# Patient Record
Sex: FEMALE | Race: WHITE | NOT HISPANIC OR LATINO | Employment: FULL TIME | URBAN - METROPOLITAN AREA
[De-identification: names, ages, dates, MRNs, and addresses within clinical notes are randomized per-mention and may not be internally consistent; named-entity substitution may affect disease eponyms.]

---

## 2017-02-07 ENCOUNTER — GENERIC CONVERSION - ENCOUNTER (OUTPATIENT)
Dept: OTHER | Facility: OTHER | Age: 32
End: 2017-02-07

## 2017-03-08 ENCOUNTER — ALLSCRIPTS OFFICE VISIT (OUTPATIENT)
Dept: OTHER | Facility: OTHER | Age: 32
End: 2017-03-08

## 2017-03-08 DIAGNOSIS — E53.8 DEFICIENCY OF OTHER SPECIFIED B GROUP VITAMINS: ICD-10-CM

## 2017-03-08 DIAGNOSIS — E55.9 VITAMIN D DEFICIENCY: ICD-10-CM

## 2017-03-08 DIAGNOSIS — E66.01 MORBID (SEVERE) OBESITY DUE TO EXCESS CALORIES (HCC): ICD-10-CM

## 2017-03-08 DIAGNOSIS — R53.83 OTHER FATIGUE: ICD-10-CM

## 2017-03-23 ENCOUNTER — TRANSCRIBE ORDERS (OUTPATIENT)
Dept: LAB | Facility: CLINIC | Age: 32
End: 2017-03-23

## 2017-03-23 ENCOUNTER — APPOINTMENT (OUTPATIENT)
Dept: LAB | Facility: CLINIC | Age: 32
End: 2017-03-23
Payer: COMMERCIAL

## 2017-03-23 DIAGNOSIS — E55.9 VITAMIN D DEFICIENCY: ICD-10-CM

## 2017-03-23 DIAGNOSIS — E53.8 DEFICIENCY OF OTHER SPECIFIED B GROUP VITAMINS: ICD-10-CM

## 2017-03-23 DIAGNOSIS — R53.83 OTHER FATIGUE: ICD-10-CM

## 2017-03-23 DIAGNOSIS — E66.01 MORBID (SEVERE) OBESITY DUE TO EXCESS CALORIES (HCC): ICD-10-CM

## 2017-03-23 LAB
25(OH)D3 SERPL-MCNC: 11.1 NG/ML (ref 30–100)
ALBUMIN SERPL BCP-MCNC: 4.1 G/DL (ref 3.5–5)
ALP SERPL-CCNC: 101 U/L (ref 46–116)
ALT SERPL W P-5'-P-CCNC: 35 U/L (ref 12–78)
ANION GAP SERPL CALCULATED.3IONS-SCNC: 8 MMOL/L (ref 4–13)
AST SERPL W P-5'-P-CCNC: 17 U/L (ref 5–45)
BILIRUB SERPL-MCNC: 0.42 MG/DL (ref 0.2–1)
BUN SERPL-MCNC: 10 MG/DL (ref 5–25)
CALCIUM SERPL-MCNC: 9.4 MG/DL (ref 8.3–10.1)
CHLORIDE SERPL-SCNC: 104 MMOL/L (ref 100–108)
CHOLEST SERPL-MCNC: 170 MG/DL (ref 50–200)
CO2 SERPL-SCNC: 27 MMOL/L (ref 21–32)
CREAT SERPL-MCNC: 0.78 MG/DL (ref 0.6–1.3)
ERYTHROCYTE [DISTWIDTH] IN BLOOD BY AUTOMATED COUNT: 12.6 % (ref 11.6–15.1)
GFR SERPL CREATININE-BSD FRML MDRD: >60 ML/MIN/1.73SQ M
GLUCOSE P FAST SERPL-MCNC: 84 MG/DL (ref 65–99)
HCT VFR BLD AUTO: 41.5 % (ref 34.8–46.1)
HDLC SERPL-MCNC: 56 MG/DL (ref 40–60)
HGB BLD-MCNC: 14.5 G/DL (ref 11.5–15.4)
LDLC SERPL CALC-MCNC: 84 MG/DL (ref 0–100)
MCH RBC QN AUTO: 31.5 PG (ref 26.8–34.3)
MCHC RBC AUTO-ENTMCNC: 34.9 G/DL (ref 31.4–37.4)
MCV RBC AUTO: 90 FL (ref 82–98)
PLATELET # BLD AUTO: 253 THOUSANDS/UL (ref 149–390)
PMV BLD AUTO: 10.4 FL (ref 8.9–12.7)
POTASSIUM SERPL-SCNC: 3.8 MMOL/L (ref 3.5–5.3)
PROT SERPL-MCNC: 8 G/DL (ref 6.4–8.2)
RBC # BLD AUTO: 4.6 MILLION/UL (ref 3.81–5.12)
SODIUM SERPL-SCNC: 139 MMOL/L (ref 136–145)
TRIGL SERPL-MCNC: 149 MG/DL
TSH SERPL DL<=0.05 MIU/L-ACNC: 3 UIU/ML (ref 0.36–3.74)
VIT B12 SERPL-MCNC: 471 PG/ML (ref 100–900)
WBC # BLD AUTO: 7.4 THOUSAND/UL (ref 4.31–10.16)

## 2017-03-23 PROCEDURE — 82306 VITAMIN D 25 HYDROXY: CPT

## 2017-03-23 PROCEDURE — 84443 ASSAY THYROID STIM HORMONE: CPT

## 2017-03-23 PROCEDURE — 85027 COMPLETE CBC AUTOMATED: CPT

## 2017-03-23 PROCEDURE — 80053 COMPREHEN METABOLIC PANEL: CPT

## 2017-03-23 PROCEDURE — 82607 VITAMIN B-12: CPT

## 2017-03-23 PROCEDURE — 80061 LIPID PANEL: CPT

## 2017-03-23 PROCEDURE — 36415 COLL VENOUS BLD VENIPUNCTURE: CPT

## 2017-03-24 ENCOUNTER — GENERIC CONVERSION - ENCOUNTER (OUTPATIENT)
Dept: OTHER | Facility: OTHER | Age: 32
End: 2017-03-24

## 2017-04-04 ENCOUNTER — ALLSCRIPTS OFFICE VISIT (OUTPATIENT)
Dept: OTHER | Facility: OTHER | Age: 32
End: 2017-04-04

## 2017-04-13 ENCOUNTER — ALLSCRIPTS OFFICE VISIT (OUTPATIENT)
Dept: OTHER | Facility: OTHER | Age: 32
End: 2017-04-13

## 2017-04-13 DIAGNOSIS — Z98.84 BARIATRIC SURGERY STATUS: ICD-10-CM

## 2017-04-18 ENCOUNTER — GENERIC CONVERSION - ENCOUNTER (OUTPATIENT)
Dept: OTHER | Facility: OTHER | Age: 32
End: 2017-04-18

## 2017-04-20 ENCOUNTER — HOSPITAL ENCOUNTER (OUTPATIENT)
Dept: RADIOLOGY | Facility: HOSPITAL | Age: 32
Discharge: HOME | End: 2017-04-20
Attending: SURGERY
Payer: COMMERCIAL

## 2017-04-20 DIAGNOSIS — Z98.84 BARIATRIC SURGERY STATUS: ICD-10-CM

## 2017-04-20 PROCEDURE — 74240 X-RAY XM UPR GI TRC 1CNTRST: CPT

## 2017-04-25 ENCOUNTER — GENERIC CONVERSION - ENCOUNTER (OUTPATIENT)
Dept: OTHER | Facility: OTHER | Age: 32
End: 2017-04-25

## 2017-05-02 ENCOUNTER — ANESTHESIA EVENT (OUTPATIENT)
Dept: GASTROENTEROLOGY | Facility: HOSPITAL | Age: 32
End: 2017-05-02
Payer: COMMERCIAL

## 2017-05-02 RX ORDER — ERGOCALCIFEROL 1.25 MG/1
50000 CAPSULE ORAL 2 TIMES WEEKLY
COMMUNITY
End: 2018-04-04 | Stop reason: ALTCHOICE

## 2017-05-03 ENCOUNTER — ANESTHESIA (OUTPATIENT)
Dept: GASTROENTEROLOGY | Facility: HOSPITAL | Age: 32
End: 2017-05-03
Payer: COMMERCIAL

## 2017-05-03 ENCOUNTER — HOSPITAL ENCOUNTER (OUTPATIENT)
Facility: HOSPITAL | Age: 32
Setting detail: OUTPATIENT SURGERY
Discharge: HOME | End: 2017-05-03
Attending: SURGERY | Admitting: SURGERY
Payer: COMMERCIAL

## 2017-05-03 VITALS
HEART RATE: 63 BPM | OXYGEN SATURATION: 100 % | BODY MASS INDEX: 46.01 KG/M2 | RESPIRATION RATE: 20 BRPM | SYSTOLIC BLOOD PRESSURE: 139 MMHG | HEIGHT: 62 IN | WEIGHT: 250 LBS | DIASTOLIC BLOOD PRESSURE: 58 MMHG | TEMPERATURE: 96.5 F

## 2017-05-03 DIAGNOSIS — E66.9 OBESITY: ICD-10-CM

## 2017-05-03 PROCEDURE — 88305 TISSUE EXAM BY PATHOLOGIST: CPT | Performed by: SURGERY

## 2017-05-03 PROCEDURE — 88342 IMHCHEM/IMCYTCHM 1ST ANTB: CPT | Performed by: SURGERY

## 2017-05-03 RX ORDER — PROPOFOL 10 MG/ML
INJECTION, EMULSION INTRAVENOUS AS NEEDED
Status: DISCONTINUED | OUTPATIENT
Start: 2017-05-03 | End: 2017-05-03 | Stop reason: SURG

## 2017-05-03 RX ORDER — SODIUM CHLORIDE 9 MG/ML
125 INJECTION, SOLUTION INTRAVENOUS CONTINUOUS
Status: DISCONTINUED | OUTPATIENT
Start: 2017-05-03 | End: 2017-05-03 | Stop reason: HOSPADM

## 2017-05-03 RX ADMIN — PROPOFOL 50 MG: 10 INJECTION, EMULSION INTRAVENOUS at 09:47

## 2017-05-03 RX ADMIN — LIDOCAINE HYDROCHLORIDE 20 MG: 20 INJECTION, SOLUTION INTRAVENOUS at 09:45

## 2017-05-03 RX ADMIN — PROPOFOL 200 MG: 10 INJECTION, EMULSION INTRAVENOUS at 09:45

## 2017-05-03 RX ADMIN — SODIUM CHLORIDE 125 ML/HR: 0.9 INJECTION, SOLUTION INTRAVENOUS at 09:38

## 2017-05-03 RX ADMIN — PROPOFOL 50 MG: 10 INJECTION, EMULSION INTRAVENOUS at 09:48

## 2017-05-08 ENCOUNTER — ALLSCRIPTS OFFICE VISIT (OUTPATIENT)
Dept: OTHER | Facility: OTHER | Age: 32
End: 2017-05-08

## 2017-05-16 ENCOUNTER — TRANSCRIBE ORDERS (OUTPATIENT)
Dept: LAB | Facility: CLINIC | Age: 32
End: 2017-05-16

## 2017-05-16 ENCOUNTER — LAB (OUTPATIENT)
Dept: LAB | Facility: CLINIC | Age: 32
End: 2017-05-16
Payer: COMMERCIAL

## 2017-05-16 DIAGNOSIS — E66.01 MORBID OBESITY, UNSPECIFIED OBESITY TYPE (HCC): Primary | ICD-10-CM

## 2017-05-16 DIAGNOSIS — E66.01 MORBID OBESITY, UNSPECIFIED OBESITY TYPE (HCC): ICD-10-CM

## 2017-05-16 PROCEDURE — 80323 ALKALOIDS NOS: CPT

## 2017-05-16 PROCEDURE — 36415 COLL VENOUS BLD VENIPUNCTURE: CPT

## 2017-05-22 LAB — MISCELLANEOUS LAB TEST RESULT: NORMAL

## 2017-06-05 ENCOUNTER — ALLSCRIPTS OFFICE VISIT (OUTPATIENT)
Dept: OTHER | Facility: OTHER | Age: 32
End: 2017-06-05

## 2017-06-06 ENCOUNTER — GENERIC CONVERSION - ENCOUNTER (OUTPATIENT)
Dept: OTHER | Facility: OTHER | Age: 32
End: 2017-06-06

## 2017-07-05 ENCOUNTER — ANESTHESIA EVENT (OUTPATIENT)
Dept: PERIOP | Facility: HOSPITAL | Age: 32
DRG: 621 | End: 2017-07-05
Payer: COMMERCIAL

## 2017-07-06 ENCOUNTER — ALLSCRIPTS OFFICE VISIT (OUTPATIENT)
Dept: OTHER | Facility: OTHER | Age: 32
End: 2017-07-06

## 2017-07-11 ENCOUNTER — GENERIC CONVERSION - ENCOUNTER (OUTPATIENT)
Dept: OTHER | Facility: OTHER | Age: 32
End: 2017-07-11

## 2017-07-17 ENCOUNTER — GENERIC CONVERSION - ENCOUNTER (OUTPATIENT)
Dept: OTHER | Facility: OTHER | Age: 32
End: 2017-07-17

## 2017-07-18 ENCOUNTER — GENERIC CONVERSION - ENCOUNTER (OUTPATIENT)
Dept: OTHER | Facility: OTHER | Age: 32
End: 2017-07-18

## 2017-07-20 ENCOUNTER — GENERIC CONVERSION - ENCOUNTER (OUTPATIENT)
Dept: OTHER | Facility: OTHER | Age: 32
End: 2017-07-20

## 2017-07-25 ENCOUNTER — HOSPITAL ENCOUNTER (INPATIENT)
Facility: HOSPITAL | Age: 32
LOS: 1 days | Discharge: HOME | DRG: 621 | End: 2017-07-26
Attending: SURGERY | Admitting: SURGERY
Payer: COMMERCIAL

## 2017-07-25 ENCOUNTER — ANESTHESIA (OUTPATIENT)
Dept: PERIOP | Facility: HOSPITAL | Age: 32
DRG: 621 | End: 2017-07-25
Payer: COMMERCIAL

## 2017-07-25 DIAGNOSIS — E66.01 MORBID OBESITY, UNSPECIFIED OBESITY TYPE (HCC): Primary | ICD-10-CM

## 2017-07-25 PROCEDURE — 0DNT4ZZ RELEASE LESSER OMENTUM, PERCUTANEOUS ENDOSCOPIC APPROACH: ICD-10-PCS | Performed by: SURGERY

## 2017-07-25 PROCEDURE — 0D164ZA BYPASS STOMACH TO JEJUNUM, PERCUTANEOUS ENDOSCOPIC APPROACH: ICD-10-PCS | Performed by: SURGERY

## 2017-07-25 PROCEDURE — 0DNL4ZZ RELEASE TRANSVERSE COLON, PERCUTANEOUS ENDOSCOPIC APPROACH: ICD-10-PCS | Performed by: SURGERY

## 2017-07-25 RX ORDER — SODIUM CHLORIDE, SODIUM LACTATE, POTASSIUM CHLORIDE, CALCIUM CHLORIDE 600; 310; 30; 20 MG/100ML; MG/100ML; MG/100ML; MG/100ML
125 INJECTION, SOLUTION INTRAVENOUS CONTINUOUS
Status: DISCONTINUED | OUTPATIENT
Start: 2017-07-25 | End: 2017-07-26

## 2017-07-25 RX ORDER — MORPHINE SULFATE 1 MG/ML
INJECTION, SOLUTION EPIDURAL; INTRATHECAL; INTRAVENOUS AS NEEDED
Status: DISCONTINUED | OUTPATIENT
Start: 2017-07-25 | End: 2017-07-25 | Stop reason: SURG

## 2017-07-25 RX ORDER — OXYCODONE HCL 5 MG/5 ML
5 SOLUTION, ORAL ORAL EVERY 4 HOURS PRN
Status: DISCONTINUED | OUTPATIENT
Start: 2017-07-25 | End: 2017-07-26 | Stop reason: HOSPADM

## 2017-07-25 RX ORDER — PROMETHAZINE HYDROCHLORIDE 25 MG/ML
25 INJECTION, SOLUTION INTRAMUSCULAR; INTRAVENOUS EVERY 4 HOURS PRN
Status: DISCONTINUED | OUTPATIENT
Start: 2017-07-25 | End: 2017-07-26 | Stop reason: HOSPADM

## 2017-07-25 RX ORDER — FENTANYL CITRATE/PF 50 MCG/ML
50 SYRINGE (ML) INJECTION
Status: DISCONTINUED | OUTPATIENT
Start: 2017-07-25 | End: 2017-07-25 | Stop reason: HOSPADM

## 2017-07-25 RX ORDER — HYDRALAZINE HYDROCHLORIDE 20 MG/ML
10 INJECTION INTRAMUSCULAR; INTRAVENOUS EVERY 6 HOURS PRN
Status: DISCONTINUED | OUTPATIENT
Start: 2017-07-25 | End: 2017-07-26 | Stop reason: HOSPADM

## 2017-07-25 RX ORDER — MORPHINE SULFATE 2 MG/ML
2 INJECTION, SOLUTION INTRAMUSCULAR; INTRAVENOUS EVERY 2 HOUR PRN
Status: DISCONTINUED | OUTPATIENT
Start: 2017-07-25 | End: 2017-07-26 | Stop reason: HOSPADM

## 2017-07-25 RX ORDER — ACETAMINOPHEN 160 MG/5ML
325 SUSPENSION, ORAL (FINAL DOSE FORM) ORAL EVERY 4 HOURS PRN
Status: DISCONTINUED | OUTPATIENT
Start: 2017-07-25 | End: 2017-07-26 | Stop reason: HOSPADM

## 2017-07-25 RX ORDER — GLYCOPYRROLATE 0.2 MG/ML
INJECTION INTRAMUSCULAR; INTRAVENOUS AS NEEDED
Status: DISCONTINUED | OUTPATIENT
Start: 2017-07-25 | End: 2017-07-25 | Stop reason: SURG

## 2017-07-25 RX ORDER — MIDAZOLAM HYDROCHLORIDE 1 MG/ML
INJECTION INTRAMUSCULAR; INTRAVENOUS AS NEEDED
Status: DISCONTINUED | OUTPATIENT
Start: 2017-07-25 | End: 2017-07-25 | Stop reason: SURG

## 2017-07-25 RX ORDER — FENTANYL CITRATE 50 UG/ML
INJECTION, SOLUTION INTRAMUSCULAR; INTRAVENOUS AS NEEDED
Status: DISCONTINUED | OUTPATIENT
Start: 2017-07-25 | End: 2017-07-25 | Stop reason: SURG

## 2017-07-25 RX ORDER — SODIUM CHLORIDE 9 MG/ML
125 INJECTION, SOLUTION INTRAVENOUS CONTINUOUS
Status: DISCONTINUED | OUTPATIENT
Start: 2017-07-25 | End: 2017-07-25

## 2017-07-25 RX ORDER — HEPARIN SODIUM 5000 [USP'U]/ML
5000 INJECTION, SOLUTION INTRAVENOUS; SUBCUTANEOUS ONCE
Status: COMPLETED | OUTPATIENT
Start: 2017-07-25 | End: 2017-07-25

## 2017-07-25 RX ORDER — ROCURONIUM BROMIDE 10 MG/ML
INJECTION, SOLUTION INTRAVENOUS AS NEEDED
Status: DISCONTINUED | OUTPATIENT
Start: 2017-07-25 | End: 2017-07-25 | Stop reason: SURG

## 2017-07-25 RX ORDER — PANTOPRAZOLE SODIUM 40 MG/1
40 INJECTION, POWDER, FOR SOLUTION INTRAVENOUS
Status: DISCONTINUED | OUTPATIENT
Start: 2017-07-26 | End: 2017-07-26 | Stop reason: HOSPADM

## 2017-07-25 RX ORDER — BUPIVACAINE HYDROCHLORIDE AND EPINEPHRINE 5; 5 MG/ML; UG/ML
INJECTION, SOLUTION PERINEURAL AS NEEDED
Status: DISCONTINUED | OUTPATIENT
Start: 2017-07-25 | End: 2017-07-25 | Stop reason: HOSPADM

## 2017-07-25 RX ORDER — OXYCODONE HCL 5 MG/5 ML
10 SOLUTION, ORAL ORAL EVERY 4 HOURS PRN
Status: DISCONTINUED | OUTPATIENT
Start: 2017-07-25 | End: 2017-07-26 | Stop reason: HOSPADM

## 2017-07-25 RX ORDER — ALBUTEROL SULFATE 2.5 MG/3ML
2.5 SOLUTION RESPIRATORY (INHALATION) ONCE AS NEEDED
Status: DISCONTINUED | OUTPATIENT
Start: 2017-07-25 | End: 2017-07-25 | Stop reason: HOSPADM

## 2017-07-25 RX ORDER — MORPHINE SULFATE 4 MG/ML
4 INJECTION, SOLUTION INTRAMUSCULAR; INTRAVENOUS EVERY 2 HOUR PRN
Status: DISCONTINUED | OUTPATIENT
Start: 2017-07-25 | End: 2017-07-26 | Stop reason: HOSPADM

## 2017-07-25 RX ORDER — MAGNESIUM HYDROXIDE 1200 MG/15ML
LIQUID ORAL AS NEEDED
Status: DISCONTINUED | OUTPATIENT
Start: 2017-07-25 | End: 2017-07-25 | Stop reason: HOSPADM

## 2017-07-25 RX ORDER — ONDANSETRON 2 MG/ML
INJECTION INTRAMUSCULAR; INTRAVENOUS AS NEEDED
Status: DISCONTINUED | OUTPATIENT
Start: 2017-07-25 | End: 2017-07-25 | Stop reason: SURG

## 2017-07-25 RX ORDER — MEPERIDINE HYDROCHLORIDE 50 MG/ML
12.5 INJECTION INTRAMUSCULAR; INTRAVENOUS; SUBCUTANEOUS AS NEEDED
Status: DISCONTINUED | OUTPATIENT
Start: 2017-07-25 | End: 2017-07-25 | Stop reason: HOSPADM

## 2017-07-25 RX ORDER — ACETAMINOPHEN 160 MG/5ML
320 SUSPENSION, ORAL (FINAL DOSE FORM) ORAL EVERY 4 HOURS PRN
Status: DISCONTINUED | OUTPATIENT
Start: 2017-07-25 | End: 2017-07-26 | Stop reason: HOSPADM

## 2017-07-25 RX ORDER — PROPOFOL 10 MG/ML
INJECTION, EMULSION INTRAVENOUS AS NEEDED
Status: DISCONTINUED | OUTPATIENT
Start: 2017-07-25 | End: 2017-07-25 | Stop reason: SURG

## 2017-07-25 RX ORDER — DEXAMETHASONE SODIUM PHOSPHATE 4 MG/ML
INJECTION, SOLUTION INTRA-ARTICULAR; INTRALESIONAL; INTRAMUSCULAR; INTRAVENOUS; SOFT TISSUE AS NEEDED
Status: DISCONTINUED | OUTPATIENT
Start: 2017-07-25 | End: 2017-07-25 | Stop reason: SURG

## 2017-07-25 RX ORDER — ONDANSETRON 2 MG/ML
4 INJECTION INTRAMUSCULAR; INTRAVENOUS EVERY 4 HOURS PRN
Status: DISCONTINUED | OUTPATIENT
Start: 2017-07-25 | End: 2017-07-26 | Stop reason: HOSPADM

## 2017-07-25 RX ADMIN — METRONIDAZOLE 500 MG: 500 INJECTION, SOLUTION INTRAVENOUS at 08:44

## 2017-07-25 RX ADMIN — ONDANSETRON HYDROCHLORIDE 4 MG: 2 INJECTION, SOLUTION INTRAVENOUS at 10:39

## 2017-07-25 RX ADMIN — ROCURONIUM BROMIDE 10 MG: 10 INJECTION, SOLUTION INTRAVENOUS at 09:45

## 2017-07-25 RX ADMIN — FENTANYL CITRATE 50 MCG: 50 INJECTION, SOLUTION INTRAMUSCULAR; INTRAVENOUS at 10:33

## 2017-07-25 RX ADMIN — MIDAZOLAM HYDROCHLORIDE 2 MG: 1 INJECTION, SOLUTION INTRAMUSCULAR; INTRAVENOUS at 08:57

## 2017-07-25 RX ADMIN — SODIUM CHLORIDE, SODIUM LACTATE, POTASSIUM CHLORIDE, AND CALCIUM CHLORIDE 125 ML/HR: .6; .31; .03; .02 INJECTION, SOLUTION INTRAVENOUS at 11:21

## 2017-07-25 RX ADMIN — HEPARIN SODIUM 5000 UNITS: 5000 INJECTION, SOLUTION INTRAVENOUS; SUBCUTANEOUS at 08:04

## 2017-07-25 RX ADMIN — LIDOCAINE HYDROCHLORIDE 3 ML: 20 INJECTION, SOLUTION INTRAVENOUS at 08:36

## 2017-07-25 RX ADMIN — FENTANYL CITRATE: 50 INJECTION INTRAMUSCULAR; INTRAVENOUS at 11:18

## 2017-07-25 RX ADMIN — FENTANYL CITRATE 100 MCG: 50 INJECTION, SOLUTION INTRAMUSCULAR; INTRAVENOUS at 08:36

## 2017-07-25 RX ADMIN — GLYCOPYRROLATE 0.6 MG: 0.2 INJECTION, SOLUTION INTRAMUSCULAR; INTRAVENOUS at 10:45

## 2017-07-25 RX ADMIN — FENTANYL CITRATE: 50 INJECTION INTRAMUSCULAR; INTRAVENOUS at 11:08

## 2017-07-25 RX ADMIN — PROPOFOL 300 MG: 10 INJECTION, EMULSION INTRAVENOUS at 08:36

## 2017-07-25 RX ADMIN — NEOSTIGMINE METHYLSULFATE 3 MG: 1 INJECTION, SOLUTION INTRAMUSCULAR; INTRAVENOUS; SUBCUTANEOUS at 10:45

## 2017-07-25 RX ADMIN — DEXAMETHASONE SODIUM PHOSPHATE 8 MG: 4 INJECTION, SOLUTION INTRAMUSCULAR; INTRAVENOUS at 08:50

## 2017-07-25 RX ADMIN — MORPHINE SULFATE 5 MG: 1 INJECTION, SOLUTION EPIDURAL; INTRATHECAL; INTRAVENOUS at 10:52

## 2017-07-25 RX ADMIN — CEFAZOLIN SODIUM 2000 MG: 2 SOLUTION INTRAVENOUS at 08:42

## 2017-07-25 RX ADMIN — SODIUM CHLORIDE 125 ML/HR: 0.9 INJECTION, SOLUTION INTRAVENOUS at 06:59

## 2017-07-25 RX ADMIN — CEFAZOLIN SODIUM 2000 MG: 2 SOLUTION INTRAVENOUS at 17:38

## 2017-07-25 RX ADMIN — FENTANYL CITRATE 50 MCG: 50 INJECTION, SOLUTION INTRAMUSCULAR; INTRAVENOUS at 10:39

## 2017-07-25 RX ADMIN — GLYCOPYRROLATE 0.2 MG: 0.2 INJECTION, SOLUTION INTRAMUSCULAR; INTRAVENOUS at 09:13

## 2017-07-25 RX ADMIN — ROCURONIUM BROMIDE 50 MG: 10 INJECTION, SOLUTION INTRAVENOUS at 08:36

## 2017-07-25 RX ADMIN — METRONIDAZOLE 500 MG: 500 INJECTION, SOLUTION INTRAVENOUS at 19:07

## 2017-07-25 RX ADMIN — ROCURONIUM BROMIDE 10 MG: 10 INJECTION, SOLUTION INTRAVENOUS at 09:34

## 2017-07-25 RX ADMIN — MORPHINE SULFATE 5 MG: 1 INJECTION, SOLUTION EPIDURAL; INTRATHECAL; INTRAVENOUS at 10:39

## 2017-07-25 RX ADMIN — MORPHINE SULFATE 4 MG: 4 INJECTION, SOLUTION INTRAMUSCULAR; INTRAVENOUS at 13:17

## 2017-07-25 RX ADMIN — ROCURONIUM BROMIDE 5 MG: 10 INJECTION, SOLUTION INTRAVENOUS at 10:17

## 2017-07-26 VITALS
RESPIRATION RATE: 16 BRPM | OXYGEN SATURATION: 96 % | DIASTOLIC BLOOD PRESSURE: 58 MMHG | WEIGHT: 245.19 LBS | HEIGHT: 62 IN | BODY MASS INDEX: 45.12 KG/M2 | HEART RATE: 86 BPM | SYSTOLIC BLOOD PRESSURE: 118 MMHG | TEMPERATURE: 97.5 F

## 2017-07-26 LAB
ANION GAP SERPL CALCULATED.3IONS-SCNC: 8 MMOL/L (ref 4–13)
BUN SERPL-MCNC: 6 MG/DL (ref 5–25)
CALCIUM SERPL-MCNC: 8.9 MG/DL (ref 8.3–10.1)
CHLORIDE SERPL-SCNC: 103 MMOL/L (ref 100–108)
CO2 SERPL-SCNC: 26 MMOL/L (ref 21–32)
CREAT SERPL-MCNC: 0.62 MG/DL (ref 0.6–1.3)
ERYTHROCYTE [DISTWIDTH] IN BLOOD BY AUTOMATED COUNT: 12.8 % (ref 11.6–15.1)
GFR SERPL CREATININE-BSD FRML MDRD: 121 ML/MIN/1.73SQ M
GLUCOSE SERPL-MCNC: 106 MG/DL (ref 65–140)
HCT VFR BLD AUTO: 35 % (ref 34.8–46.1)
HGB BLD-MCNC: 11.9 G/DL (ref 11.5–15.4)
MCH RBC QN AUTO: 31 PG (ref 26.8–34.3)
MCHC RBC AUTO-ENTMCNC: 34 G/DL (ref 31.4–37.4)
MCV RBC AUTO: 91 FL (ref 82–98)
PLATELET # BLD AUTO: 197 THOUSANDS/UL (ref 149–390)
PMV BLD AUTO: 11.5 FL (ref 8.9–12.7)
POTASSIUM SERPL-SCNC: 3.9 MMOL/L (ref 3.5–5.3)
RBC # BLD AUTO: 3.84 MILLION/UL (ref 3.81–5.12)
SODIUM SERPL-SCNC: 137 MMOL/L (ref 136–145)
WBC # BLD AUTO: 15.41 THOUSAND/UL (ref 4.31–10.16)

## 2017-07-26 PROCEDURE — 85027 COMPLETE CBC AUTOMATED: CPT | Performed by: SURGERY

## 2017-07-26 PROCEDURE — C9113 INJ PANTOPRAZOLE SODIUM, VIA: HCPCS | Performed by: SURGERY

## 2017-07-26 PROCEDURE — 80048 BASIC METABOLIC PNL TOTAL CA: CPT | Performed by: SURGERY

## 2017-07-26 RX ORDER — OXYCODONE HYDROCHLORIDE AND ACETAMINOPHEN 5; 325 MG/1; MG/1
1 TABLET ORAL EVERY 4 HOURS PRN
Qty: 25 TABLET | Refills: 0 | Status: SHIPPED | OUTPATIENT
Start: 2017-07-26 | End: 2017-08-09

## 2017-07-26 RX ADMIN — CEFAZOLIN SODIUM 2000 MG: 2 SOLUTION INTRAVENOUS at 02:14

## 2017-07-26 RX ADMIN — PANTOPRAZOLE SODIUM 40 MG: 40 INJECTION, POWDER, FOR SOLUTION INTRAVENOUS at 08:26

## 2017-07-26 RX ADMIN — SODIUM CHLORIDE, SODIUM LACTATE, POTASSIUM CHLORIDE, AND CALCIUM CHLORIDE 125 ML/HR: .6; .31; .03; .02 INJECTION, SOLUTION INTRAVENOUS at 06:00

## 2017-07-26 RX ADMIN — METRONIDAZOLE 500 MG: 500 INJECTION, SOLUTION INTRAVENOUS at 02:46

## 2017-07-28 ENCOUNTER — GENERIC CONVERSION - ENCOUNTER (OUTPATIENT)
Dept: OTHER | Facility: OTHER | Age: 32
End: 2017-07-28

## 2017-08-02 ENCOUNTER — ALLSCRIPTS OFFICE VISIT (OUTPATIENT)
Dept: OTHER | Facility: OTHER | Age: 32
End: 2017-08-02

## 2017-08-04 ENCOUNTER — ALLSCRIPTS OFFICE VISIT (OUTPATIENT)
Dept: OTHER | Facility: OTHER | Age: 32
End: 2017-08-04

## 2017-09-12 ENCOUNTER — GENERIC CONVERSION - ENCOUNTER (OUTPATIENT)
Dept: OTHER | Facility: OTHER | Age: 32
End: 2017-09-12

## 2017-11-06 ENCOUNTER — APPOINTMENT (OUTPATIENT)
Dept: LAB | Facility: HOSPITAL | Age: 32
End: 2017-11-06
Payer: COMMERCIAL

## 2017-11-06 ENCOUNTER — OFFICE VISIT (OUTPATIENT)
Dept: URGENT CARE | Facility: MEDICAL CENTER | Age: 32
End: 2017-11-06
Payer: COMMERCIAL

## 2017-11-06 DIAGNOSIS — R12 HEARTBURN: ICD-10-CM

## 2017-11-06 DIAGNOSIS — R05.9 COUGH: ICD-10-CM

## 2017-11-06 DIAGNOSIS — Z98.84 BARIATRIC SURGERY STATUS: ICD-10-CM

## 2017-11-06 DIAGNOSIS — K91.2 POSTSURGICAL MALABSORPTION, NOT ELSEWHERE CLASSIFIED (CODE): ICD-10-CM

## 2017-11-06 DIAGNOSIS — E55.9 VITAMIN D DEFICIENCY: ICD-10-CM

## 2017-11-06 PROCEDURE — 87070 CULTURE OTHR SPECIMN AEROBIC: CPT

## 2017-11-06 PROCEDURE — 87147 CULTURE TYPE IMMUNOLOGIC: CPT

## 2017-11-06 PROCEDURE — 99203 OFFICE O/P NEW LOW 30 MIN: CPT

## 2017-11-07 LAB — BACTERIA THROAT CULT: ABNORMAL

## 2017-11-07 NOTE — PROGRESS NOTES
Assessment  1  Cough (786 2) (R05)   2  Acute frontal sinusitis (461 1) (J01 10)    Plan  Acute frontal sinusitis    · Amoxicillin 875 MG Oral Tablet; TAKE 1 TABLET EVERY 12 HOURS DAILY   · Fluticasone Propionate 50 MCG/ACT Nasal Suspension; USE 2 SPRAYS IN EACH  NOSTRIL ONCE DAILY  Cough    · (1) THROAT CULTURE (CULTURE, UPPER RESPIRATORY); Status:Active -  Retrospective By Protocol Authorization; Requested [id number];     Discussion/Summary  Discussion Summary:   Patient started on amoxicillin 875 mg b i d  for 10 days and fluticasone nasal spray for nasal congestion postnasal drip  Encourage patient to continue Chloraseptic spray as needed for sore throat  Throat culture performed  Medication Side Effects Reviewed: Possible side effects of new medications were reviewed with the patient/guardian today  Understands and agrees with treatment plan: The treatment plan was reviewed with the patient/guardian  The patient/guardian understands and agrees with the treatment plan   Counseling Documentation With Imm: The patient was counseled regarding diagnostic results  Chief Complaint  1  Cold Symptoms  Chief Complaint Free Text Note Form: Patient here with cold symptoms for two weeks  She has been using Chloraseptic spray for her throat  She reports swollen glands especially left side, as well as a productive cough for yellow sputum  Denies fever  History of Present Illness  HPI: The patient is a 33year old female who presents to [place] [address] Now with a chief complaint of cold symptoms for 2 weeks  The patient stated that her symptoms got worse yesterday, and she noticed her swollen cervical lymph nodes  The patient has associated productive cough that is worse at night, sore throat with swallowing, headaches, and chills  The patient denies any otalgia, fever, chest pain, shortness of breath, nausea, abdominal pain, episodes of emesis or diarrhea   The patient has been taking chloroseptic spray and Robitussin for her symptoms, but has been getting minor relief  The patient has seasonal allergies, but does not currently take any decongestant medications  The patientâs daughter was recently diagnosed with bronchitis  Hospital Based Practices Required Assessment:   Pain Assessment   the patient states they have pain  The pain is located in the headache  (on a scale of 0 to 10, the patient rates the pain at 4 )   Abuse And Domestic Violence Screen   Domestic violence screen not done today  Reason DV Screen not done: not alone    Depression And Suicide Screen  Suicide screen not done today  -- Reason suicide screen not done: not alone  Prefered Language is  [place]  Primary Language is  English  Review of Systems  Focused-Female:   Constitutional: chills, but-- no fever  ENT: sore throat-- and-- nasal discharge, but-- no earache  Cardiovascular: no complaints of slow or fast heart rate, no chest pain, no palpitations, no leg claudication or lower extremity edema  Respiratory: cough, but-- no shortness of breath  Gastrointestinal: no abdominal pain,-- no nausea,-- no vomiting-- and-- no diarrhea  Neurological: headache  ROS Reviewed:   ROS reviewed  Active Problems  1  Blood tests prior to treatment or procedure (V72 63) (Z01 812)   2  Fatigue (780 79) (R53 83)   3  Heartburn (787 1) (R12)   4  Heat intolerance (780 99) (R68 89)   5  History of laparoscopic adjustable gastric banding (V45 86) (Z98 84)   6  Hospital discharge follow-up (V67 59) (Z09)   7  Hot flashes (627 2) (N95 1)   8  Irritable bowel syndrome (564 1) (K58 9)   9  Migraine, ophthalmoplegic (346 20) (G43 B0)   10  Morbid obesity (278 01) (E66 01)   11  Postgastrectomy malabsorption (579 3) (K91 2,Z90 3)   12  Preop cardiovascular exam (V72 81) (Z01 810)   13  Stopped smoking between 1 and 3 months ago (V15 82) (Z87 891)   14  Vitamin B12 deficiency (266 2) (E53 8)   15   Vitamin D deficiency (268 9) (E55 9)    Past Medical History  1  History of A Fall (E888 9)   2  History of Acute frontal sinusitis (461 1) (J01 10)   3  History of Cyst of joint of ankle or foot (719 87)   4  History of Elevated blood pressure   5  History of acute bronchitis (V12 69) (Z87 09)   6  History of dysfunctional uterine bleeding (V13 29) (Z87 42)   7  History of fatigue (V13 89) (Z87 898)   8  History of tachycardia (V13 89) (Z87 898)   9  History of Palpitations (785 1) (R00 2)   10  History of Skin Lesion  Active Problems And Past Medical History Reviewed: The active problems and past medical history were reviewed and updated today  Family History  Mother    1  Family history of Atrial Fibrillation   2  Family history of Family Health Status Of Mother - Alive   3  Family history of Gallbladder Disease  Father    4  Denied: Family history of Family Health Status Father  Brother    5  Family history of Family Health Status Brother 1   6  Family history of hypertension (V17 49) (Z82 49)  Family History    7  Family history of diabetes mellitus (V18 0) (Z83 3)   8  Family history of hypertension (V17 49) (Z82 49)    Social History   · Caffeine Use   · Current every day smoker (305 1) (F17 200)   · Living Independently With Spouse   · Marital History - Currently    · No illicit drug use   · Non drinker / no alcohol use   · Occupation:   · Passive smoke exposure (V15 89) (Z77 22)   · Sexually Active   · Stopped smoking between 1 and 3 months ago (V15 82) (Z87 891)   · Two children   · Uses Safety Equipment - Seatbelts    Surgical History  1  History of  Section   2  History of  Section   3  History of Cholecystectomy   4  History of Gallbladder Surgery   5  History of Gastric Surgery For Morbid Obesity Bypass With Fe-en-Y   6  History of Hysterectomy   7  History of Hysterectomy   8  History of Laparosc Gastric Restrictive Proc By Adjustable Gastric Band   9   History of Laparosc Gastric Restrictive Proc By Adjustable Gastric Band   10  History of Laparosc Sm Intest Laser Vaporization Endometriotic Tissue    Current Meds   1  Daily Multiple Vitamins TABS; Therapy: (Recorded:41Uex7595) to Recorded   2  Omeprazole 20 MG Oral Capsule Delayed Release; TAKE 1 CAPSULE DAILY EVERY   MORNING BEFORE BREAKFAST; Therapy: 09MXB2831 to (Harjinder Bliss)  Requested for: 41SQZ3663; Last   Rx:29Xbk4247 Ordered   3  Vitamin D (Ergocalciferol) 32478 UNIT Oral Capsule; Take 1 capsule twice weekly for 12   weeks; Therapy: 31KWT5025 to (Last Rx:24Mar2017)  Requested for: 24Mar2017 Ordered  Medication List Reviewed: The medication list was reviewed and updated today  Allergies  1  No Known Drug Allergies    Vitals  Signs   Recorded: 69VAZ4529 11:31AM   Temperature: 97 6 F, Tympanic  Heart Rate: 90  Respiration: 18  Systolic: 453  Diastolic: 74  Height: 5 ft 2 in  Weight: 219 lb 6 4 oz  BMI Calculated: 40 13  BSA Calculated: 1 99  O2 Saturation: 96  Pain Scale: 4    Physical Exam    Constitutional   General appearance: No acute distress, well appearing and well nourished  -- Appears stated age  Ears, Nose, Mouth, and Throat   External inspection of ears and nose: Normal     Otoscopic examination: Tympanic membranes translucent with normal light reflex  Canals patent without erythema  Nasal mucosa, septum, and turbinates: Abnormal  -- Erythematous and edematous nasal mucus membranes bilaterally  Oropharynx: Abnormal  -- Cobble-stoning appearance of the posterior oropharynx  Possible white tonsillar stones present bilaterally  Pulmonary   Respiratory effort: No increased work of breathing or signs of respiratory distress  Auscultation of lungs: Clear to auscultation  Cardiovascular   Auscultation of heart: Normal rate and rhythm, normal S1 and S2, without murmurs  Lymphatic   Palpation of lymph nodes in neck: Abnormal   Swollen and tender anterior cervical lymphadenopathy bilaterally     Psychiatric Orientation to person, place, and time: Normal     Additional Exam:  Frontal and maxillary sinus tenderness with palpation        Results/Data  Rapid James Pascual- POC 31XGN8105 12:14PM Christy Mejias     Test Name Result Flag Reference   Rapid Strep Negative                   Lab Studies Reviewed: Rapid strep test-negative      Future Appointments    Date/Time Provider Specialty Site   11/08/2017 10:00 AM José Luis Vance, [facility] General Surgery [facility]     Signatures   Electronically signed by : CRUZITO Bobo ; Nov 6 2017 12:40PM EST                       (Author)

## 2017-11-08 ENCOUNTER — ALLSCRIPTS OFFICE VISIT (OUTPATIENT)
Dept: OTHER | Facility: OTHER | Age: 32
End: 2017-11-08

## 2017-11-09 NOTE — PROGRESS NOTES
Assessment  1  S/P gastric bypass (V45 86) (Z98 84)   2  Postgastrectomy malabsorption (579 3) (K91 2,Z90 3)   3  Heartburn (787 1) (R12)   4  Vitamin D deficiency (268 9) (E55 9)   5  Obesity (BMI 30-39 9) (278 00) (E66 9)    Plan  Heartburn, Postgastrectomy malabsorption, S/P gastric bypass, Vitamin D deficiency    · (1) CBC/ PLT (NO DIFF); Status:Active; Requested QVN:35BPY4506;    Perform:[facility]; AFTAB:47DMS4914; Ordered;For:Heartburn, Postgastrectomy malabsorption, S/P gastric bypass, Vitamin D deficiency; Ordered By:Vandana Vance;   · (1) COMPREHENSIVE METABOLIC PANEL; Status:Active; Requested OGP:01RMG0883;    Perform:[facility]; BAR:20PCJ8830; Ordered;For:Heartburn, Postgastrectomy malabsorption, S/P gastric bypass, Vitamin D deficiency; Ordered By:Vandana Vance;   · (1) FERRITIN; Status:Active; Requested LQR:57SGN2114;    Perform:[facility]; LXM:02GAI0684; Ordered;For:Heartburn, Postgastrectomy malabsorption, S/P gastric bypass, Vitamin D deficiency; Ordered By:Vandana Vance;   · (1) FOLATE; Status:Active; Requested VJP:74WPX8731;    Perform:[facility]; NNE:07UNB0987; Ordered;For:Heartburn, Postgastrectomy malabsorption, S/P gastric bypass, Vitamin D deficiency; Ordered By:Vandana Vance;   · (1) LIPID PANEL, FASTING; Status:Active; Requested LDJ:[id number];    Perform:[facility]; DWE:68QVD8970; Ordered;For:Heartburn, Postgastrectomy malabsorption, S/P gastric bypass, Vitamin D deficiency; Ordered By:Tracee Vance;   · (1) PTH N-TERMINAL (INTACT); Status:Active; Requested [id number];    Perform:[facility]; FELI:00OPI2636; Ordered;For:Heartburn, Postgastrectomy malabsorption, S/P gastric bypass, Vitamin D deficiency; Ordered By:Vandana Vance;   · (1) VITAMIN A; Status:Active; Requested LOP:73NPX3017;    Perform:[facility]; OYZ:44LSK3518; Ordered;For:Heartburn, Postgastrectomy malabsorption, S/P gastric bypass, Vitamin D deficiency; Ordered By:Tracee Vance;   · (1) VITAMIN B1, WHOLE BLOOD; Status:Active; Requested KVF:[id number];    Perform:[facility]; PBB:09ZQG3152; Ordered;For:Heartburn, Postgastrectomy malabsorption, S/P gastric bypass, Vitamin D deficiency; Ordered By:Pierce Vance;   · (1) VITAMIN B12; Status:Active; Requested XYC:68KNI3948;    Perform:[facility]; AEY:92DFO0332; Ordered;For:Heartburn, Postgastrectomy malabsorption, S/P gastric bypass, Vitamin D deficiency; Ordered By:Pierce Vance;   · (1) VITAMIN D 25-HYDROXY; Status:Active; Requested NAL:28OYP2001;    Perform:[facility]; XKR:73RWT8830; Ordered;Postgastrectomy malabsorption, S/P gastric bypass, Vitamin D deficiency; Ordered By:Pierce Vance;    Discussion/Summary    Follow up in 3 months  Follow diet as discussed  Get lab work done prior to your next office visit  Follow vitamin/mineral recommendations as reviewed with you  Exercise as tolerated  our office if you have any problems with abdominal pain especially if associated with fever, chills, nausea, vomiting, or any other concerns  lap Fe-en-y gastric bypass surgery/obesity bmi 89year old female   status post laparoscopic Fe-en-Y gastric bypass surgery by Dr Arabella Rangel here for routine follow-up   Has no complaints todaytolerating a regular dieteating at least 60 grams of protein per day30/60 minute rule with liquidsat least 64 ounces of fluid per dayexercising regularly-she is doing a combination of cardio and weight resistance for up to 1 hour at least 4 days a week  has lost 51% excess body weight loss and considering higher starting BMI her weight loss is excellent for this timeframeBMI is now 35 7  Malabsorption- pateint is at risk for malabsorption of vitamins/minerals secondary to malabsorption from her procedure and restriction of intakescurrent supplements and advised on sameis currently taking 4 bariatric fusion and one calcium supplement with Dto add an extra 2000 IU vitamin d3 daily  is due for routine labs-she will get these done before her next visit in 3 months  vitamin D deficiency-she took high potency vitamin D supplement for 8 weeks as advised pre-op-advised on maintenance dose now- advised to add an EXTRA 2000 IU vitamin D3 daily to current bariatric vitamins  await repeat labs for further advice-recommended she get labs done next month after increasing supplement  heart burn-controlled on ppi-continue for now  The patient has the current Goals: Continued weight loss with good nutrition intakesvitamin/mineral levelsas tolerated  The patent has the current Barriers: None identified  Patient is able to Self-Care  Educational resources provided: N/a  Possible side effects of new medications were reviewed with the patient/guardian today  The treatment plan was reviewed with the patient/guardian  The patient/guardian understands and agrees with the treatment plan   She was advised to follow up due to malabsorption risks  Self Referrals: No      Chief Complaint  Patient said she tolerates diet well, exercises regularly and takes vitamins daily  Post-Op  Post-Op Bariatric Surgery:  Ella Sierra is status post laparoscopic Fe-en-Y procedure,-- performed on 7/25/2017--   by Dr Hiram Byrd  HPI: today's weight is 195 lb pounds,-- today's BMI is 35 7-- and-- her total weight loss is 51% excess body weight loss pounds  The patient reports no nausea,-- no vomiting,-- no constipation,-- no diarrhea,-- no chest pain-- and-- no abdominal pain  Diet and Exercise: Diet history reviewed and discussed with the patient  Weight loss/gains to date discussed with the patient  Supplements: multivitamins-- and-- calcium  PE:  Abdominal exam: soft-- and-- no incisional hernia  Assessment:  Post-op, the patient is doing well  Plan: Activity restrictions: None    Instructions / Recommendations: recommended a daily protein intake of  grams,-- vitamin supplement(s) recommended,-- mineral supplement(s) recommended,-- recommended exercising at least 150 minutes per week,-- diet as discussed-- and-- instructed to call the office for concerns, questions, or problems  The patient was instructed to follow up in 3 months  Review of Systems   Constitutional: planned weight loss, but-- not feeling poorly  Cardiovascular: no chest pain-- and-- no palpitations  Respiratory: no shortness of breath-- and-- no wheezing  Gastrointestinal: no abdominal pain,-- no nausea,-- no vomiting,-- no constipation-- and-- no diarrhea  Psychiatric: no anxiety-- and-- no depression  Active Problems    1  Acute frontal sinusitis (461 1) (J01 10)   2  Blood tests prior to treatment or procedure (V72 63) (Z01 812)   3  Cough (786 2) (R05)   4  Fatigue (780 79) (R53 83)   5  Heartburn (787 1) (R12)   6  Heat intolerance (780 99) (R68 89)   7  History of laparoscopic adjustable gastric banding (V45 86) (Z98 84)   8  Hospital discharge follow-up (V67 59) (Z09)   9  Hot flashes (627 2) (N95 1)   10  Irritable bowel syndrome (564 1) (K58 9)   11  Migraine, ophthalmoplegic (346 20) (G43 B0)   12  Morbid obesity (278 01) (E66 01)   13  Postgastrectomy malabsorption (579 3) (K91 2,Z90 3)   14  Preop cardiovascular exam (V72 81) (Z01 810)   15  Stopped smoking between 1 and 3 months ago (V15 82) (Z87 891)   16  Vitamin B12 deficiency (266 2) (E53 8)   17   Vitamin D deficiency (268 9) (E55 9)    Social History     · Caffeine Use   · Current every day smoker (305 1) (F17 200)   · Living Independently With Spouse   · AND CHILDREN   · Marital History - Currently    · No illicit drug use   · Non drinker / no alcohol use   · Occupation:   ·    · Passive smoke exposure (V15 89) (Z77 22)   · Sexually Active   · Stopped smoking between 1 and 3 months ago (V15 82) (Z87 891)   · Two children   · Uses Safety Equipment - Seatbelts  The social history was reviewed and updated today  Current Meds   1  Amoxicillin 875 MG Oral Tablet; TAKE 1 TABLET EVERY 12 HOURS DAILY; Therapy: 91XVT9057 to (Evaluate:16Nov2017)  Requested for: 17FLV4782; Last Rx:06Nov2017 Ordered   2  Calcium CHEW; Therapy: (Recorded:08Nov2017) to Recorded   3  Daily Multiple Vitamins TABS; Therapy: (Recorded:08May2017) to Recorded   4  Fluticasone Propionate 50 MCG/ACT Nasal Suspension; USE 2 SPRAYS IN EACH NOSTRIL ONCE DAILY; Therapy: 77SIN8181 to (Last GM:92LIF9299)  Requested for: 88GOS3997 Ordered   5  Omeprazole 20 MG Oral Capsule Delayed Release; TAKE 1 CAPSULE DAILY EVERY MORNING BEFORE BREAKFAST; Therapy: 53GSN3394 to ([place]u [place])  Requested for: 55IYP8319; Last Rx:80Kjh8913 Ordered    The medication list was reviewed and updated today  Allergies  1  No Known Drug Allergies    Vitals   Recorded: 11BRX0941 09:45AM   Temperature 98 1 F   Heart Rate 64   Respiration 14   Systolic 017   Diastolic 70   Height 5 ft 2 in   Weight 195 lb    BMI Calculated 35 67   BSA Calculated 1 89       Physical Exam   Constitutional  General appearance: No acute distress, well appearing and well nourished  Eyes bilateral conjunctiva without pallor  Ears, Nose, Mouth, and Throat mucous membranes moist   Pulmonary  Respiratory effort: No increased work of breathing or signs of respiratory distress  Auscultation of lungs: Clear to auscultation  Cardiovascular  Auscultation of heart: Normal rate and rhythm, normal S1 and S2, without murmurs  Abdomen soft, no incisional hernias appreciated    Musculoskeletal  Gait and station: Normal    Psychiatric  Orientation to person, place, and time: Normal    Mood and affect: Normal          Future Appointments    Date/Time Provider Specialty Site   02/09/2018 10:00 AM Karlene Vance [address] General Surgery [facility]       Signatures   Electronically signed by : Sydnie Orellana; Nov 8 2017 10:16AM EST (Author)    Electronically signed by : CRUZITO Tubbs ; Nov 8 2017 11:44AM EST                       (Co-author)

## 2018-01-09 NOTE — PROGRESS NOTES
Message  Pt called to cancel today's appointment- pt no longer needs 6 month program for her insurance  Reviewed workflow with pt  Pt had cardiac appointment yesterday and states she has completed nicotine test as well  Will notify pt's  that pt is ready for scheduling  Active Problems    1  Blood tests prior to treatment or procedure (V72 63) (Z01 812)   2  Fatigue (780 79) (R53 83)   3  Heartburn (787 1) (R12)   4  Heat intolerance (780 99) (R68 89)   5  History of laparoscopic adjustable gastric banding (V45 86) (Z98 84)   6  Hot flashes (627 2) (N95 1)   7  Irritable bowel syndrome (564 1) (K58 9)   8  Migraine, ophthalmoplegic (346 20) (G43 B0)   9  Morbid obesity (278 01) (E66 01)   10  Preop cardiovascular exam (V72 81) (Z01 810)   11  Stopped smoking between 1 and 3 months ago (V15 82) (Z87 891)   12  Tachycardia (785 0) (R00 0)   13  Vitamin B12 deficiency (266 2) (E53 8)   14  Vitamin D deficiency (268 9) (E55 9)    Current Meds   1  Daily Multiple Vitamins TABS; Therapy: (Recorded:73Smp4773) to Recorded   2  Vitamin D (Ergocalciferol) 95046 UNIT Oral Capsule; Take 1 capsule twice weekly for 12   weeks; Therapy: 84DSG3692 to (Last Rx:24Mar2017)  Requested for: 24Mar2017 Ordered    Allergies    1   No Known Drug Allergies    Signatures   Electronically signed by : SAVI Nye RD; Jun 6 2017 10:10AM EST                       (Author)

## 2018-01-12 VITALS
WEIGHT: 260.03 LBS | HEART RATE: 79 BPM | HEIGHT: 62 IN | SYSTOLIC BLOOD PRESSURE: 130 MMHG | TEMPERATURE: 97.5 F | BODY MASS INDEX: 47.85 KG/M2 | RESPIRATION RATE: 13 BRPM | DIASTOLIC BLOOD PRESSURE: 86 MMHG

## 2018-01-12 VITALS — BODY MASS INDEX: 45.45 KG/M2 | HEIGHT: 62 IN | WEIGHT: 247 LBS

## 2018-01-12 NOTE — PROGRESS NOTES
Message  placed pre op call  patient very concerned about losing the 20 pounds dr Juan Diego Willard told her she needs to lose before surgery  she states she is following the liquid diet and eating 2 cups of non-starchy vegetables daily  patient willing to come in Tuesday 7/18/17 at 7:30am for weight check  Active Problems    1  Blood tests prior to treatment or procedure (V72 63) (Z01 812)   2  Fatigue (780 79) (R53 83)   3  Heartburn (787 1) (R12)   4  Heat intolerance (780 99) (R68 89)   5  History of laparoscopic adjustable gastric banding (V45 86) (Z98 84)   6  Hot flashes (627 2) (N95 1)   7  Irritable bowel syndrome (564 1) (K58 9)   8  Migraine, ophthalmoplegic (346 20) (G43 B0)   9  Morbid obesity (278 01) (E66 01)   10  Preop cardiovascular exam (V72 81) (Z01 810)   11  Stopped smoking between 1 and 3 months ago (V15 82) (Z87 891)   12  Tachycardia (785 0) (R00 0)   13  Vitamin B12 deficiency (266 2) (E53 8)   14  Vitamin D deficiency (268 9) (E55 9)    Current Meds   1  Daily Multiple Vitamins TABS; Therapy: (Recorded:98Lzn8723) to Recorded   2  Omeprazole 20 MG Oral Capsule Delayed Release; TAKE 1 CAPSULE DAILY EVERY   MORNING BEFORE BREAKFAST; Therapy: 57PUR2928 to (Valencia Braun)  Requested for: 50YXG9297; Last   Rx:58Yns7626 Ordered   3  Oxycodone-Acetaminophen 5-325 MG Oral Tablet (Percocet); TAKE 1 TABLET EVERY 4   TO 6 HOURS AS NEEDED FOR PAIN;   Therapy: 06NLA9931 to (Evaluate:48Exz5862); Last Rx:55Cjp2846 Ordered   4  Vitamin D (Ergocalciferol) 61751 UNIT Oral Capsule; Take 1 capsule twice weekly for 12   weeks; Therapy: 42HNH6372 to (Last Rx:24Mar2017)  Requested for: 24Mar2017 Ordered    Allergies    1  No Known Drug Allergies    Signatures   Electronically signed by :  Linda Vela, RAMILAWMSWMSKARLA,TARA; Jul 17 2017 11:53AM EST                       (Author)

## 2018-01-12 NOTE — PROGRESS NOTES
Chief Complaint  Chief Complaint Free Text Note Form: due to patient's high level of anxiety prior to surgery she was concerned about her pre-surgery weight loss  Invited patient to come in for a weight check this morning at 7:15  Patient was encouraged to continue to follow the medical and dietary directions she was provided  Discussed stress management skills and encouraged patient to practice 10reps of deep breathing 4x's daily  Patient will arrive for her Thursday weight check early in the morning  Active Problems    1  Blood tests prior to treatment or procedure (V72 63) (Z01 812)   2  Fatigue (780 79) (R53 83)   3  Heartburn (787 1) (R12)   4  Heat intolerance (780 99) (R68 89)   5  History of laparoscopic adjustable gastric banding (V45 86) (Z98 84)   6  Hot flashes (627 2) (N95 1)   7  Irritable bowel syndrome (564 1) (K58 9)   8  Migraine, ophthalmoplegic (346 20) (G43 B0)   9  Morbid obesity (278 01) (E66 01)   10  Preop cardiovascular exam (V72 81) (Z01 810)   11  Stopped smoking between 1 and 3 months ago (V15 82) (Z87 891)   12  Tachycardia (785 0) (R00 0)   13  Vitamin B12 deficiency (266 2) (E53 8)   14  Vitamin D deficiency (268 9) (E55 9)    Past Medical History    1  History of A Fall (E888 9)   2  History of Acute frontal sinusitis (461 1) (J01 10)   3  History of Cyst of joint of ankle or foot (719 87)   4  History of Elevated blood pressure   5  History of acute bronchitis (V12 69) (Z87 09)   6  History of dysfunctional uterine bleeding (V13 29) (Z87 42)   7  History of fatigue (V13 89) (Z87 898)   8  History of Palpitations (785 1) (R00 2)   9  History of Skin Lesion    Surgical History    1  History of  Section   2  History of  Section   3  History of Cholecystectomy   4  History of Gallbladder Surgery   5  History of Hysterectomy   6  History of Hysterectomy   7  History of Laparosc Gastric Restrictive Proc By Adjustable Gastric Band   8   History of Laparosc Gastric Restrictive Proc By Adjustable Gastric Band   9  History of Laparosc Sm Intest Laser Vaporization Endometriotic Tissue    Family History  Mother    1  Family history of Atrial Fibrillation   2  Family history of Family Health Status Of Mother - Alive   3  Family history of Gallbladder Disease  Father    4  Denied: Family history of Family Health Status Father  Brother    5  Family history of Family Health Status Brother 1   6  Family history of hypertension (V17 49) (Z82 49)  Family History    7  Family history of diabetes mellitus (V18 0) (Z83 3)   8  Family history of hypertension (V17 49) (Z82 49)    Social History    · Caffeine Use   · Current every day smoker (305 1) (F17 200)   · Living Independently With Spouse   · Marital History - Currently    · No illicit drug use   · Non drinker / no alcohol use   · Occupation:   · Passive smoke exposure (V15 89) (Z77 22)   · Sexually Active   · Stopped smoking between 1 and 3 months ago (V15 82) (Z87 891)   · Two children   · Uses Safety Equipment - Seatbelts    Current Meds   1  Daily Multiple Vitamins TABS; Therapy: (Recorded:97Lde1584) to Recorded   2  Omeprazole 20 MG Oral Capsule Delayed Release; TAKE 1 CAPSULE DAILY EVERY   MORNING BEFORE BREAKFAST; Therapy: 09UHD3207 to (Sara Coburn)  Requested for: 52VQS1947; Last   Rx:21Nxo7093 Ordered   3  Oxycodone-Acetaminophen 5-325 MG Oral Tablet (Percocet); TAKE 1 TABLET EVERY 4   TO 6 HOURS AS NEEDED FOR PAIN;   Therapy: 21WDT9702 to (Evaluate:76Ezm9744); Last Rx:78Gcm8028 Ordered   4  Vitamin D (Ergocalciferol) 34485 UNIT Oral Capsule; Take 1 capsule twice weekly for 12   weeks; Therapy: 65GZQ9905 to (Last Rx:24Mar2017)  Requested for: 24Mar2017 Ordered    Allergies    1  No Known Drug Allergies    Future Appointments    Date/Time Provider Specialty Site   08/04/2017 09:30 AM CRUZITO Owens  General Surgery [place] [address] [facility]     Signatures   Electronically signed by :  Michaelle Correa Demetrius Boas; Jul 18 2017 10:03AM EST                       (Author)    Electronically signed by :  CRUZITO Martínez ; Jul 24 2017 12:14PM EST

## 2018-01-12 NOTE — PROGRESS NOTES
History of Present Illness  Bariatric MNT [facility] Screening Preop [facility]:     She was on time  the appointment lasted: 60 minutes  Her surgeon is Dr Hiram Byrd  The patient was present at the session  The diagnosis/reason for the appointment is: She has Grade III Obesity with a BMI of 46 8  Diet Order: Nutritional Assesment for Bariatric Surgery  Her goal weight is 172#-184#  She has the following comorbidities: elke ,  x2, Lap Band , Band removal   Labs: Gordo Anthony She was reminded to have her labs drawn   She does not need to monitor her glucose  Exercise Frequency:  She exercises walks 3-4 days per week approx 30-60 minutes  Relationship to food: She eats when she is bored and eats large portions  She knows the difference between being comfortably full and uncomfortably full and knows the difference between being stuffed and comfortably full  She felt/thought they felt her best at 130-150# pounds she last weighed this   She completed a food journal She drinks 3-4 cups of water daily She drinks 6-8 caffienated beverages daily Her motivators are:pt wants to improve health and quality of life  Her obesity/being overweight is related to positive energy balance and is evidenced by: BMI-46  8  Knowledge Deficit Prior to Education  She pt had previous dietary education prior to band placement in   Barriers to education:  She has no barriers to education  Medical Nutrition Therapy Intervention: Individualized Meal Plan, Daily Food /Exercise Diary, Lifestyle /Behavior Modification Techniques, Basic Pathophysiology of Obesity, Checklist of the Overview of Lesson Plans and Surgical changes to Stomach/GI  Area's Reviewed: Post - surgery goal weight, Web forum, Lifestyle Nima Brian Modification Techniques, [employer] in [place] and Pre- surgery goals Henrique Ion  Brief Review of Vitamins, diet progression for post-op and Pathophysiology of Obesity       Her comprehension of the presented material was good  Her receptivity to the presented material was good  Her motivation was good  Provided: Nutrition Guidelines for Gastric Surgery, Bariatric Program Pre- Surgery Nutrition and Goals  Goals: Her set goal for physical activity is walk , for 30-60 minutes, 5 days a week  Review [employer] in [place]   Post Surgery: She will adhere to the diet progression: remain hydrated, consume adequate protein; and take vitamins as outlined in guidelines  Patient is a 28year old who is here for nutritional evaluation for weight loss surgery  I reviewed co-morbidities and medications with patient  She first recalls having problems with weight gain at the age of 9  She has dieted in the past with variable success but would regain the weight back  Pt had a lap gastric band placed in 2010 by Dr Oneida Hutchins  Pt states she weighed 300# prior to band placement and reached a low weight of 150# in 2011  Pt states after her last pregnancy in 2011 she developed band slippage, and had the band removed in August 2016  Pt states she has gained approx 25-30# since band removal  (Refer to diet history for details)  For her personal pre-op goals she will: switch from regular sweetened iced tea to diet decaffeinated tea, begin packing her lunches the night before, and walk for 30-60 minutes 5 days per week  She was not interested in working on a specific number of calories, but plans to food journal to track her intake  She was instructed on the importance of consistent vitamin and mineral intake after her surgery to prevent deficiencies  She currently takes no vitamins  Reviewed importance of support after surgery and discussed the [employer], [employer], pep rally and support group  Patient states adequate knowledge of nutrition, exercise and behavior modification required for long term success   Pt  is recommended for surgery and is aware to practice lifestyle modification, complete all six lesson plans in bariatric manual, and complete two-week total liquid diet to lose weight prior to surgery  Recommendations: She was provided contact information for any questions  She is recommended for surgery  ~He/She needs additional education  She states adequate knowledge of nutrition, exercise, and behavior modification  She will attend a Team Meeting approximately 2-3 weeks prior to surgery  Active Problems    1  Elevated blood pressure   2  Fatigue (780 79) (R53 83)   3  Heat intolerance (780 99) (R68 89)   4  Hot flashes (627 2) (N95 1)   5  Irritable bowel syndrome (564 1) (K58 9)   6  Migraine, ophthalmoplegic (346 20) (G43 B0)   7  Morbid obesity (278 01) (E66 01)   8  Palpitations (785 1) (R00 2)   9  Tobacco use (305 1) (Z72 0)   10  Vitamin B12 deficiency (266 2) (E53 8)   11  Vitamin D deficiency (268 9) (E55 9)    Past Medical History    1  History of A Fall (E888 9)   2  History of Acute frontal sinusitis (461 1) (J01 10)   3  History of Cyst of joint of ankle or foot (719 87)   4  History of acute bronchitis (V12 69) (Z87 09)   5  History of dysfunctional uterine bleeding (V13 29) (Z87 42)   6  History of fatigue (V13 89) (Z87 898)   7  History of Skin Lesion    Surgical History    1  History of  Section   2  History of  Section   3  History of Cholecystectomy   4  History of Gallbladder Surgery   5  History of Hysterectomy   6  History of Hysterectomy   7  History of Laparosc Gastric Restrictive Proc By Adjustable Gastric Band   8  History of Laparosc Gastric Restrictive Proc By Adjustable Gastric Band   9  History of Laparosc Sm Intest Laser Vaporization Endometriotic Tissue    Family History  Mother    1  Family history of Atrial Fibrillation   2  Family history of Family Health Status Of Mother - Alive   3  Family history of Gallbladder Disease  Father    4  Denied: Family history of Family Health Status Father  Brother    5  Family history of Family Health Status Brother 1   6   Family history of Hypertension (V17 49)  Family History    7  Family history of Diabetes Mellitus (V18 0)   8  Family history of Diabetes Mellitus (250 00)   9  Family history of Hypertension (V17 49)    Social History    · Denied: Alcohol   · Denied: History of Alcohol Use (History)   · Caffeine Use   · Current Every Day Smoker (305 1)   · Current Smoker (305 1)   · Denied: History of Drug Use   · Living Independently With Spouse   · Marital History - Currently    · Denied: History of  History   · Occupation:   · Passive smoke exposure (V15 89) (Z77 22)   · Sexually Active   · Tobacco use (305 1) (Z72 0)   · Two children   · Uses Safety Equipment - Seatbelts    Allergies    1  No Known Drug Allergies    Vitals  Signs   Recorded: 42YYR4504 10:08AM   Height: 5 ft 2 in  Weight: 256 lb   BMI Calculated: 46 82  BSA Calculated: 2 12    Future Appointments    Date/Time Provider Specialty Site   04/13/2017 09:30 AM CRUZITO Perez  General Surgery [facility]   03/08/2017 11:00 AM CRUZITO Andrade   Bariatric Medicine [facility]     Signatures   Electronically signed by : SAVI Whitten RD; Feb 7 2017 10:09AM EST                       (Author)    Electronically signed by : CRUZITO Hernandez ; Feb 9 2017 12:50PM EST                       (Validation)

## 2018-01-13 VITALS — HEIGHT: 62 IN | WEIGHT: 263.3 LBS | BODY MASS INDEX: 48.45 KG/M2

## 2018-01-13 VITALS
WEIGHT: 256 LBS | BODY MASS INDEX: 47.11 KG/M2 | RESPIRATION RATE: 16 BRPM | HEIGHT: 62 IN | SYSTOLIC BLOOD PRESSURE: 110 MMHG | DIASTOLIC BLOOD PRESSURE: 70 MMHG | TEMPERATURE: 96.9 F | HEART RATE: 77 BPM

## 2018-01-13 VITALS
WEIGHT: 259.3 LBS | HEIGHT: 62 IN | DIASTOLIC BLOOD PRESSURE: 74 MMHG | HEART RATE: 72 BPM | SYSTOLIC BLOOD PRESSURE: 122 MMHG | BODY MASS INDEX: 47.72 KG/M2 | TEMPERATURE: 98.5 F

## 2018-01-13 VITALS — BODY MASS INDEX: 40.13 KG/M2 | WEIGHT: 219.4 LBS

## 2018-01-13 NOTE — PROGRESS NOTES
Active Problems    1  Blood tests prior to treatment or procedure (V72 63) (Z01 812)   2  Fatigue (780 79) (R53 83)   3  Heartburn (787 1) (R12)   4  Heat intolerance (780 99) (R68 89)   5  History of laparoscopic adjustable gastric banding (V45 86) (Z98 84)   6  Hot flashes (627 2) (N95 1)   7  Irritable bowel syndrome (564 1) (K58 9)   8  Migraine, ophthalmoplegic (346 20) (G43 B0)   9  Morbid obesity (278 01) (E66 01)   10  Tachycardia (785 0) (R00 0)   11  Vitamin B12 deficiency (266 2) (E53 8)   12  Vitamin D deficiency (268 9) (E55 9)    Current Meds   1  Vitamin D (Ergocalciferol) 82332 UNIT Oral Capsule; Take 1 capsule twice weekly for 12   weeks; Therapy: 48NWT9728 to (Last Rx:24Mar2017)  Requested for: 24Mar2017 Ordered    Allergies    1  No Known Drug Allergies    Discussion/Summary    Pt called requesting nutrition follow-up  Pt states she has made many dietary and lifestyle changes and does not know why she is gaining weight  Instructed pt to keep detailed food, beverage, and activity log for further review   Scheduled follow-up for Tuesday, 4/25/17 at 11:30am       Signatures   Electronically signed by : SAVI Castillo RD; Apr 18 2017  2:20PM EST                       (Author)

## 2018-01-13 NOTE — RESULT NOTES
Message   Please let patient know that her vitamin D level is low and I sent a prescription to her pharmacy to take as instructed     Verified Results  (1) CBC/ PLT (NO DIFF) 68QRQ4322 08:52AM [employer] Order Number: [id number]     Test Name Result Flag Reference   HEMATOCRIT 41 5 %  34 8-46 1   HEMOGLOBIN 14 5 g/dL  11 5-15 4   MCHC 34 9 g/dL  31 4-37 4   MCH 31 5 pg  26 8-34 3   MCV 90 fL  82-98   PLATELET COUNT 180 Thousands/uL  149-390   RBC COUNT 4 60 Million/uL  3 81-5 12   RDW 12 6 %  11 6-15 1   WBC COUNT 7 40 Thousand/uL  4 31-10 16   MPV 10 4 fL  8 9-12 7     (1) COMPREHENSIVE METABOLIC PANEL 85GYB4907 37:02CX [employer] Order Number: [id number]     Test Name Result Flag Reference   SODIUM 139 mmol/L  136-145   POTASSIUM 3 8 mmol/L  3 5-5 3   CHLORIDE 104 mmol/L  100-108   CARBON DIOXIDE 27 mmol/L  21-32   ANION GAP (CALC) 8 mmol/L  4-13   BLOOD UREA NITROGEN 10 mg/dL  5-25   CREATININE 0 78 mg/dL  0 60-1 30   Standardized to IDMS reference method   CALCIUM 9 4 mg/dL  8 3-10 1   BILI, TOTAL 0 42 mg/dL  0 20-1 00   ALK PHOSPHATAS 101 U/L     ALT (SGPT) 35 U/L  12-78   AST(SGOT) 17 U/L  5-45   ALBUMIN 4 1 g/dL  3 5-5 0   TOTAL PROTEIN 8 0 g/dL  6 4-8 2   eGFR Non-African American      >60 0 ml/min/1 73sq m   - Patient Instructions: This is a fasting blood test  Water,black tea or black  coffee only after 9:00pm the night before test Drink 2 glasses of water the morning of test   National Kidney Disease Education Program recommendations are as follows:  GFR calculation is accurate only with a steady state creatinine  Chronic Kidney disease less than 60 ml/min/1 73 sq  meters  Kidney failure less than 15 ml/min/1 73 sq  meters     GLUCOSE FASTING 84 mg/dL  65-99     (1) LIPID PANEL, FASTING 23Mar2017 08:52AM [employer] Order Number: [id number]     Test Name Result Flag Reference   CHOLESTEROL 170 mg/dL     HDL,DIRECT 56 mg/dL  40-60   Specimen collection should occur prior to Metamizole administration due to the potential for falsely depressed results  LDL CHOLESTEROL CALCULATED 84 mg/dL  0-100   - Patient Instructions: This is a fasting blood test  Water,black tea or black  coffee only after 9:00pm the night before test   Drink 2 glasses of water the morning of test     - Patient Instructions: This is a fasting blood test  Water,black tea or black  coffee only after 9:00pm the night before test Drink 2 glasses of water the morning of test   Triglyceride:         Normal              <150 mg/dl       Borderline High    150-199 mg/dl       High               200-499 mg/dl       Very High          >499 mg/dl  Cholesterol:         Desirable        <200 mg/dl      Borderline High  200-239 mg/dl      High             >239 mg/dl  HDL Cholesterol:        High    >59 mg/dL      Low     <41 mg/dL  LDL CALCULATED:    This screening LDL is a calculated result  It does not have the accuracy of the Direct Measured LDL in the monitoring of patients with hyperlipidemia and/or statin therapy  Direct Measure LDL (DQD611) must be ordered separately in these patients  TRIGLYCERIDES 149 mg/dL  <=150   Specimen collection should occur prior to N-Acetylcysteine or Metamizole administration due to the potential for falsely depressed results  (1) TSH WITH FT4 REFLEX 23Mar2017 08:52AM Josh Armstrong    Order Number: [id number]     Test Name Result Flag Reference   TSH 3 000 uIU/mL  0 358-3 740   - Patient Instructions: This is a fasting blood test  Water,black tea or black  coffee only after 9:00pm the night before test Drink 2 glasses of water the morning of test   Patients undergoing fluorescein dye angiography may retain small amounts of fluorescein in the body for 48-72 hours post procedure  Samples containing fluorescein can produce falsely depressed TSH values   If the patient had this procedure,a specimen should be resubmitted post fluorescein clearance  The recommended reference ranges for TSH during pregnancy are as follows:  First trimester 0 1 to 2 5 uIU/mL  Second trimester  0 2 to 3 0 uIU/mL  Third trimester 0 3 to 3 0 uIU/m     (1) VITAMIN B12 23Mar2017 08:52AM Shazia Lott    Order Number: [id number]     Test Name Result Flag Reference   VITAMIN B12 471 pg/mL  100-900   - Patient Instructions: This is a fasting blood test  Water,black tea or black  coffee only after 9:00pm the night before test Drink 2 glasses of water the morning of test      (1) VITAMIN D 25-HYDROXY 23Mar2017 08:52AM Shazia Lott    Order Number: [id number]     Test Name Result Flag Reference   VIT D 25-HYDROX 11 1 ng/mL L 30 0-100 0   This assay is a certified procedure of the CDC Vitamin D Standardization Certification Program (VDSCP)     Deficiency <20ng/ml   Insufficiency 20-30ng/ml   Sufficient  ng/ml     *Patients undergoing fluorescein dye angiography may retain small amounts of fluorescein in the body for 48-72 hours post procedure  Samples containing fluorescein can produce falsely elevated Vitamin D values  If the patient had this procedure, a specimen should be resubmitted post fluorescein clearance  Plan  Vitamin D deficiency    · Vitamin D (Ergocalciferol) 74540 UNIT Oral Capsule;  Take 1 capsule twice weekly  for 12 weeks

## 2018-01-14 VITALS — BODY MASS INDEX: 47.11 KG/M2 | WEIGHT: 256 LBS | HEIGHT: 62 IN

## 2018-01-14 VITALS
BODY MASS INDEX: 35.88 KG/M2 | TEMPERATURE: 98.1 F | DIASTOLIC BLOOD PRESSURE: 70 MMHG | WEIGHT: 195 LBS | SYSTOLIC BLOOD PRESSURE: 124 MMHG | HEART RATE: 64 BPM | HEIGHT: 62 IN | RESPIRATION RATE: 14 BRPM

## 2018-01-14 VITALS
SYSTOLIC BLOOD PRESSURE: 132 MMHG | WEIGHT: 258 LBS | HEART RATE: 88 BPM | BODY MASS INDEX: 47.48 KG/M2 | DIASTOLIC BLOOD PRESSURE: 86 MMHG | RESPIRATION RATE: 16 BRPM | HEIGHT: 62 IN

## 2018-01-14 VITALS
SYSTOLIC BLOOD PRESSURE: 130 MMHG | HEIGHT: 62 IN | DIASTOLIC BLOOD PRESSURE: 80 MMHG | OXYGEN SATURATION: 97 % | BODY MASS INDEX: 48.83 KG/M2 | HEART RATE: 107 BPM | WEIGHT: 265.38 LBS | TEMPERATURE: 98.4 F | RESPIRATION RATE: 16 BRPM

## 2018-01-14 VITALS
WEIGHT: 236 LBS | DIASTOLIC BLOOD PRESSURE: 78 MMHG | RESPIRATION RATE: 13 BRPM | TEMPERATURE: 97.7 F | HEIGHT: 62 IN | BODY MASS INDEX: 43.43 KG/M2 | HEART RATE: 75 BPM | SYSTOLIC BLOOD PRESSURE: 120 MMHG

## 2018-01-14 NOTE — PROGRESS NOTES
Active Problems    1  Blood tests prior to treatment or procedure (V72 63) (Z01 812)   2  Fatigue (780 79) (R53 83)   3  Heartburn (787 1) (R12)   4  Heat intolerance (780 99) (R68 89)   5  History of laparoscopic adjustable gastric banding (V45 86) (Z98 84)   6  Hot flashes (627 2) (N95 1)   7  Irritable bowel syndrome (564 1) (K58 9)   8  Migraine, ophthalmoplegic (346 20) (G43 B0)   9  Morbid obesity (278 01) (E66 01)   10  Preop cardiovascular exam (V72 81) (Z01 810)   11  Stopped smoking between 1 and 3 months ago (V15 82) (Z87 891)   12  Tachycardia (785 0) (R00 0)   13  Vitamin B12 deficiency (266 2) (E53 8)   14  Vitamin D deficiency (268 9) (E55 9)    Current Meds   1  Daily Multiple Vitamins TABS; Therapy: (Recorded:11Tfq6311) to Recorded   2  Omeprazole 20 MG Oral Capsule Delayed Release; TAKE 1 CAPSULE DAILY EVERY   MORNING BEFORE BREAKFAST; Therapy: 90MKA7422 to (Marine Cypher)  Requested for: 80KLG4747; Last   Rx:52Uwt8314 Ordered   3  Oxycodone-Acetaminophen 5-325 MG Oral Tablet (Percocet); TAKE 1 TABLET EVERY 4   TO 6 HOURS AS NEEDED FOR PAIN;   Therapy: 39TAN0388 to (Evaluate:24Mae2159); Last Rx:69Fym7127 Ordered   4  Vitamin D (Ergocalciferol) 90054 UNIT Oral Capsule; Take 1 capsule twice weekly for 12   weeks; Therapy: 57NYH9900 to (Last Rx:24Mar2017)  Requested for: 24Mar2017 Ordered    Allergies    1  No Known Drug Allergies    Discussion/Summary    Pt called with questions about liquid diet  Discussed nonstarchy vegetables, clear liquid allowed  Pt states she is currently at 250#  Pt will be in for final pre-operative weight check next Thursday or Friday        Signatures   Electronically signed by : SAVI Barbosa RD; Jul 11 2017 11:05AM EST                       (Author)

## 2018-01-15 VITALS
TEMPERATURE: 98.3 F | HEART RATE: 92 BPM | WEIGHT: 259 LBS | HEIGHT: 62 IN | SYSTOLIC BLOOD PRESSURE: 130 MMHG | DIASTOLIC BLOOD PRESSURE: 78 MMHG | BODY MASS INDEX: 47.66 KG/M2

## 2018-01-15 VITALS
HEIGHT: 62 IN | HEART RATE: 100 BPM | RESPIRATION RATE: 16 BRPM | TEMPERATURE: 96.6 F | BODY MASS INDEX: 49.04 KG/M2 | SYSTOLIC BLOOD PRESSURE: 116 MMHG | WEIGHT: 266.5 LBS | DIASTOLIC BLOOD PRESSURE: 82 MMHG

## 2018-01-16 NOTE — MISCELLANEOUS
Assessment    1  Hospital discharge follow-up (V67 59) (Z09)   2  History of laparoscopic adjustable gastric banding (V45 86) (Z98 84)   3  Morbid obesity (278 01) (E66 01)    Discussion/Summary  Discussion Summary:   Cpm and monitoring, f/u approx 3-4 months  Counseling Documentation With Imm: The patient was counseled regarding instructions for management, patient and family education, impressions  Medication SE Review and Pt Understands Tx: The treatment plan was reviewed with the patient/guardian  The patient/guardian understands and agrees with the treatment plan      Chief Complaint  Chief Complaint Free Text Note Form: Pt is here for a RAMÓN  History of Present Illness  TCM Communication [facility]: The patient is being contacted for follow-up after hospitalization  She was hospitalized at [address] [address]  The dates of hospitalization: 07/25/2017 till 07/26/2017, date of admission: 07/25/2017, date of discharge: 07/26/2017  Diagnosis: Bariatric surgery/gastric bypass or sleeve gastrectomy  She was discharged to home  Medications reviewed and updated today  Follow-up appointments with other specialists: Appt with Dr Esha Pena on 8/4/2017  The patient is currently asymptomatic  Counseling was provided to the patient  Communication performed and completed by Davis Sandoval   HPI: states, "doing well, only thing bit of problem was on friday severe pain in back and shoulders from the gas, heating pad took care of it, and after that was fine"  since surgery having a bm every day instead of every third or fourth day that had been usual for very long time      Review of Systems  Complete-Female:   Constitutional: No fever, no chills, feels well, no tiredness, no recent weight gain or weight loss  ENT: no complaints of earache, no loss of hearing, no nose bleeds, no nasal discharge, no sore throat, no hoarseness     Cardiovascular: No complaints of slow heart rate, no fast heart rate, no chest pain, no palpitations, no leg claudication, no lower extremity edema  Respiratory: No complaints of shortness of breath, no wheezing, no cough, no SOB on exertion, no orthopnea, no PND  Gastrointestinal: as noted in HPI, no abdominal pain, no nausea, no vomiting, no constipation, no diarrhea and no blood in stools  Genitourinary: No complaints of dysuria, no incontinence, no pelvic pain, no dysmenorrhea, no vaginal discharge or bleeding  Musculoskeletal: No complaints of arthralgias, no myalgias, no joint swelling or stiffness, no limb pain or swelling  Integumentary: No complaints of skin rash or lesions, no itching, no skin wounds, no breast pain or lump  Neurological: No complaints of headache, no confusion, no convulsions, no numbness, no dizziness or fainting, no tingling, no limb weakness, no difficulty walking  Psychiatric: Not suicidal, no sleep disturbance, no anxiety or depression, no change in personality, no emotional problems  Endocrine: No complaints of proptosis, no hot flashes, no muscle weakness, no deepening of the voice, no feelings of weakness  Hematologic/Lymphatic: No complaints of swollen glands, no swollen glands in the neck, does not bleed easily, does not bruise easily  Active Problems     1  Blood tests prior to treatment or procedure (V72 63) (Z01 812)   2  Fatigue (780 79) (R53 83)   3  Heartburn (787 1) (R12)   4  Heat intolerance (780 99) (R68 89)   5  History of laparoscopic adjustable gastric banding (V45 86) (Z98 84)   6  Hot flashes (627 2) (N95 1)   7  Irritable bowel syndrome (564 1) (K58 9)   8  Migraine, ophthalmoplegic (346 20) (G43 B0)   9  Postgastrectomy malabsorption (579 3) (K91 2,Z90 3)   10  Preop cardiovascular exam (V72 81) (Z01 810)   11  Stopped smoking between 1 and 3 months ago (V15 82) (Z87 891)   12  Vitamin B12 deficiency (266 2) (E53 8)   13   Vitamin D deficiency (268 9) (E55 9)    Morbid obesity (278 01) (E66 01)          Past Medical History    1  History of A Fall (E888 9)   2  History of Acute frontal sinusitis (461 1) (J01 10)   3  History of Cyst of joint of ankle or foot (719 87)   4  History of Elevated blood pressure   5  History of acute bronchitis (V12 69) (Z87 09)   6  History of dysfunctional uterine bleeding (V13 29) (Z87 42)   7  History of fatigue (V13 89) (Z87 898)   8  History of tachycardia (V13 89) (Z87 898)   9  History of Palpitations (785 1) (R00 2)   10  History of Skin Lesion    Surgical History    1  History of  Section   2  History of  Section   3  History of Cholecystectomy   4  History of Gallbladder Surgery   5  History of Gastric Surgery For Morbid Obesity Bypass With Fe-en-Y   6  History of Hysterectomy   7  History of Hysterectomy   8  History of Laparosc Gastric Restrictive Proc By Adjustable Gastric Band   9  History of Laparosc Gastric Restrictive Proc By Adjustable Gastric Band   10  History of Laparosc Sm Intest Laser Vaporization Endometriotic Tissue  Surgical History Reviewed: The surgical history was reviewed and updated today  Family History  Mother    1  Family history of Atrial Fibrillation   2  Family history of Family Health Status Of Mother - Alive   3  Family history of Gallbladder Disease  Father    4  Denied: Family history of Family Health Status Father  Brother    5  Family history of Family Health Status Brother 1   6  Family history of hypertension (V17 49) (Z82 49)  Family History    7  Family history of diabetes mellitus (V18 0) (Z83 3)   8  Family history of hypertension (V17 49) (Z82 49)  Family History Reviewed: The family history was reviewed and updated today         Social History    · Caffeine Use   · Current every day smoker (305 1) (F17 200)   · Living Independently With Spouse   · Marital History - Currently    · No illicit drug use   · Non drinker / no alcohol use   · Occupation:   · Passive smoke exposure (V15 89) (Z77 22)   · Sexually Active   · Stopped smoking between 1 and 3 months ago (V15 82) (Z87 891)   · Two children   · Uses Safety Equipment - Seatbelts  Social History Reviewed: The social history was reviewed and updated today  The social history was reviewed and is unchanged  Current Meds   1  Daily Multiple Vitamins TABS; Therapy: (Recorded:75Moy1016) to Recorded   2  Omeprazole 20 MG Oral Capsule Delayed Release; TAKE 1 CAPSULE DAILY EVERY   MORNING BEFORE BREAKFAST; Therapy: 55NUC2919 to (Olinda Larson)  Requested for: 89CSE3373; Last   Rx:37Ovo2865 Ordered   3  Vitamin D (Ergocalciferol) 14217 UNIT Oral Capsule; Take 1 capsule twice weekly for 12   weeks; Therapy: 99LDQ5270 to (Last Rx:24Mar2017)  Requested for: 24Mar2017 Ordered  Medication List Reviewed: The medication list was reviewed and updated today  Allergies    1  No Known Drug Allergies    Vitals  Signs   Recorded: 67Tyd6679 01:30PM   Temperature: 98 1 F  Heart Rate: 78  Respiration: 16  Systolic: 053  Diastolic: 70  Height: 5 ft 2 in  Weight: 237 lb 6 oz  BMI Calculated: 43 42  BSA Calculated: 2 06  O2 Saturation: 94    Physical Exam    Constitutional   General appearance: Abnormal   comfortable, normal body odor, morbidly obese, clothing appropriate, not hirsute, rested, not exhausted, well hydrated and appearance reflects stated age  Eyes   Conjunctiva and lids: No swelling, erythema or discharge  Pupils and irises: Equal, round and reactive to light  Ears, Nose, Mouth, and Throat   Oropharynx: Normal with no erythema, edema, exudate or lesions  Pulmonary   Respiratory effort: No increased work of breathing or signs of respiratory distress  Auscultation of lungs: Clear to auscultation  Cardiovascular   Auscultation of heart: Normal rate and rhythm, normal S1 and S2, without murmurs  Examination of extremities for edema and/or varicosities: Normal     Carotid pulses: Normal     Abdomen   Abdomen: Non-tender, no masses    except minimal tenderness over abd wall wounds  Liver and spleen: No hepatomegaly or splenomegaly  Lymphatic   Palpation of lymph nodes in neck: No lymphadenopathy  Musculoskeletal   Gait and station: Normal     Digits and nails: Normal without clubbing or cyanosis  Skin   Skin and subcutaneous tissue: Normal without rashes or lesions  Examination of the skin for lesions: Abnormal   abd surg wounds healing well, no erythema or drainage  Neurologic   Reflexes: 2+ and symmetric  Sensation: No sensory loss  Psychiatric   Mood and affect: Normal          Message   Recorded as Task   Date: 07/26/2017 02:21 PM, Created By: System   Task Name: Hospital RAMÓN   Assigned To: Rossana Sotelo   Regarding Patient: Cortes Dennison, Status: Complete   Comment:    System - 26 Jul 2017 2:21 PM     Patient discharged from hospital   Patient Name: Clover Pa  Patient YOB: 1985  Discharge Date: 7/26/2017  Facility: [facility] - 31 Jul 2017 3:13 PM     TASK EDITED  Patient was contacted for DEBRA KAY on 7/31  Appt scheduled for 8/2, appt is just a follow up from recent bariatric surgery     Kay Sandoval - 31 Jul 2017 3:14 PM     TASK COMPLETED     Future Appointments    Date/Time Provider Specialty Site   11/08/2017 10:00 AM Ha Virgen [facility] General Surgery [facility]   09/05/2017 08:30 AM MARÍA ELENA Donnelly, CANDI Dietitian [facility]     Signatures   Electronically signed by : Ramila Jeffery DO; Aug 13 2017 11:50AM EST                       (Author)

## 2018-01-16 NOTE — PROGRESS NOTES
History of Present Illness  Bariatric Behavioral Health Evaluation [facility]:   She is here today because: Increase health, increase mobility, reduce chronic pain  She is seeking a Bariatric surgery evaluation  She researched this option for: 1 year  She realizes the post-op requirements patient has community contacts with bariatric surgery Her pre-morbid level of function was: patient currently uses tobacco 1/2-1 pack daily  Her Psychiatric/Psychological diagnosis: She does not have an outpatient counselor  She does not have a Psychiatrist    She has not had Inpatient Treatment  Family Constellation: Family Constellation: Mother: heart disease, tobacco use,  Father: no contact,  , ETOH  Siblings: hypertension,  Spouse: tobacco use, good support for patient  Children: 2) good health  She lives withher spouse and children  Domestic Violence: has not happened  Abuse History: (denies childhood trauma)   Additional Comments: health, weight, school, family relationships  Physical/psychological assessment Appearance: appropriate   Sociability: average  Affect: appropriate  Mood: calm  Thought Process: coherent  Speech: normal  Content: no impairment  The patient was oriented to person, oriented to place, oriented to time and normal memory   normal attention span  no decreased concentration ability  normal judgment  Risk assessment: The patient is currently asymptomatic  No associated symptoms are reported  Sexual history: She is not pregnant  She does not have a history of STD's  Recommendations: She is recommended for surgery  The Following Ratings are based on my: Obsevation of this individual over the last  Risk of Harm to Self or Others: The following are demographic risk factors associated with harm to self: , [place], or   (n/a)  Recent Specific Risk Factors: The patient is currently asymptomatic  No associated symptoms are reported     Access to Weapons:   She has access to the following weapons: denies access to weapons   Summary and Recommendations:   Low: No thoughts or occasional thoughts of suicide, but no intent or actions  Self inflicted scratches, abrasions, or other self- injurious of behavior where medical attention is typically not warranted  No elements of homicidality or occasional thoughts but no plan, intent, or actions  Active Problems    1  Elevated blood pressure   2  Fatigue (780 79) (R53 83)   3  Heat intolerance (780 99) (R68 89)   4  Hot flashes (627 2) (N95 1)   5  Irritable bowel syndrome (564 1) (K58 9)   6  Migraine, ophthalmoplegic (346 20) (G43 B0)   7  Morbid obesity (278 01) (E66 01)   8  Palpitations (785 1) (R00 2)   9  Tobacco use (305 1) (Z72 0)   10  Vitamin B12 deficiency (266 2) (E53 8)   11  Vitamin D deficiency (268 9) (E55 9)    Past Medical History    1  History of A Fall (E888 9)   2  History of Acute frontal sinusitis (461 1) (J01 10)   3  History of Cyst of joint of ankle or foot (719 87)   4  History of acute bronchitis (V12 69) (Z87 09)   5  History of dysfunctional uterine bleeding (V13 29) (Z87 42)   6  History of fatigue (V13 89) (Z87 898)   7  History of Skin Lesion    Surgical History    1  History of  Section   2  History of  Section   3  History of Cholecystectomy   4  History of Gallbladder Surgery   5  History of Hysterectomy   6  History of Hysterectomy   7  History of Laparosc Gastric Restrictive Proc By Adjustable Gastric Band   8  History of Laparosc Gastric Restrictive Proc By Adjustable Gastric Band   9  History of Laparosc Sm Intest Laser Vaporization Endometriotic Tissue    Family History  Mother    1  Family history of Atrial Fibrillation   2  Family history of Family Health Status Of Mother - Alive   3  Family history of Gallbladder Disease  Father    4  Denied: Family history of Family Health Status Father  Brother    5  Family history of Family Health Status Brother 1   6   Family history of Hypertension (V17 49)  Family History    7  Family history of Diabetes Mellitus (V18 0)   8  Family history of Diabetes Mellitus (250 00)   9  Family history of Hypertension (V17 49)    Social History    · Denied: Alcohol   · Denied: History of Alcohol Use (History)   · Caffeine Use   · Current Every Day Smoker (305 1)   · Current Smoker (305 1)   · Denied: History of Drug Use   · Living Independently With Spouse   · Marital History - Currently    · Denied: History of  History   · Occupation:   · Passive smoke exposure (V15 89) (Z77 22)   · Sexually Active   · Tobacco use (305 1) (Z72 0)   · Two children   · Uses Safety Equipment - Seatbelts    Allergies    1  No Known Drug Allergies     Note   Note:   Patient denies Axis I diagnosis or treatment  Patient educated regarding the impact of nicotine and alcohol on the post bariatric surgery patient  Patient meets criteria for surgery at this program and is referred to physician  Future Appointments    Date/Time Provider Specialty Site   04/13/2017 09:30 AM CRUZITO Ruiz  General Surgery [facility]   03/08/2017 11:00 AM CRUZITO Aaron  Bariatric Medicine [facility]     Signatures   Electronically signed by :  Melany Denson, LCSWMSWMSKARLA,TARA; Feb 7 2017 11:22AM EST                       (Author)    Electronically signed by : CRUZITO Walls ; Feb 9 2017 12:50PM EST                       (Validation)

## 2018-01-17 NOTE — PROGRESS NOTES
Discussion/Summary  Discussion Summary:   Patient attended 5 week post op class  Reviewed diet progression, vitamin and mineral recommendations, 3060 rules, volume of foods, protein supplements, hydration needs, antacid guidelines, recommendation to f/u with pcp concerning med adjustments, exercise guidelines, hair shedding, contraception guidelines, constipation prevention and treatment, alcohol avoidance and recommendations of when to call the office  Patient was also weighed and filled out form for program   Time was left for discussion and to answer questions  Active Problems    1  Blood tests prior to treatment or procedure (V72 63) (Z01 812)   2  Fatigue (780 79) (R53 83)   3  Heartburn (787 1) (R12)   4  Heat intolerance (780 99) (R68 89)   5  History of laparoscopic adjustable gastric banding (V45 86) (Z98 84)   6  Hospital discharge follow-up (V67 59) (Z09)   7  Hot flashes (627 2) (N95 1)   8  Irritable bowel syndrome (564 1) (K58 9)   9  Migraine, ophthalmoplegic (346 20) (G43 B0)   10  Morbid obesity (278 01) (E66 01)   11  Postgastrectomy malabsorption (579 3) (K91 2,Z90 3)   12  Preop cardiovascular exam (V72 81) (Z01 810)   13  Stopped smoking between 1 and 3 months ago (V15 82) (Z87 891)   14  Vitamin B12 deficiency (266 2) (E53 8)   15  Vitamin D deficiency (268 9) (E55 9)    Past Medical History    1  History of A Fall (E888 9)   2  History of Acute frontal sinusitis (461 1) (J01 10)   3  History of Cyst of joint of ankle or foot (719 87)   4  History of Elevated blood pressure   5  History of acute bronchitis (V12 69) (Z87 09)   6  History of dysfunctional uterine bleeding (V13 29) (Z87 42)   7  History of fatigue (V13 89) (Z87 898)   8  History of tachycardia (V13 89) (Z87 898)   9  History of Palpitations (785 1) (R00 2)   10  History of Skin Lesion    Surgical History    1  History of  Section   2  History of  Section   3  History of Cholecystectomy   4  History of Gallbladder Surgery   5  History of Gastric Surgery For Morbid Obesity Bypass With Fe-en-Y   6  History of Hysterectomy   7  History of Hysterectomy   8  History of Laparosc Gastric Restrictive Proc By Adjustable Gastric Band   9  History of Laparosc Gastric Restrictive Proc By Adjustable Gastric Band   10  History of Laparosc Sm Intest Laser Vaporization Endometriotic Tissue    Family History  Mother    1  Family history of Atrial Fibrillation   2  Family history of Family Health Status Of Mother - Alive   3  Family history of Gallbladder Disease  Father    4  Denied: Family history of Family Health Status Father  Brother    5  Family history of Family Health Status Brother 1   6  Family history of hypertension (V17 49) (Z82 49)  Family History    7  Family history of diabetes mellitus (V18 0) (Z83 3)   8  Family history of hypertension (V17 49) (Z82 49)    Social History    · Caffeine Use   · Current every day smoker (305 1) (F17 200)   · Living Independently With Spouse   · Marital History - Currently    · No illicit drug use   · Non drinker / no alcohol use   · Occupation:   · Passive smoke exposure (V15 89) (Z77 22)   · Sexually Active   · Stopped smoking between 1 and 3 months ago (V15 82) (Z87 891)   · Two children   · Uses Safety Equipment - Seatbelts    Current Meds   1  Daily Multiple Vitamins TABS; Therapy: (Recorded:12Hha6831) to Recorded   2  Omeprazole 20 MG Oral Capsule Delayed Release; TAKE 1 CAPSULE DAILY EVERY   MORNING BEFORE BREAKFAST; Therapy: 77MXF2620 to (Diane Francis)  Requested for: 28TIU5557; Last   Rx:41Acl3698 Ordered   3  Vitamin D (Ergocalciferol) 26875 UNIT Oral Capsule; Take 1 capsule twice weekly for 12   weeks; Therapy: 26FDB7295 to (Last Rx:24Mar2017)  Requested for: 24Mar2017 Ordered    Allergies    1   No Known Drug Allergies    Vitals  Signs   Recorded: 25Ech8743 11:37AM   Weight: 219 lb 6 4 oz  BMI Calculated: 40 13  BSA Calculated: 1 99    Future Appointments    Date/Time Provider Specialty Site   11/08/2017 10:00 AM Danielito Vance, [address] General Surgery [facility]     Signatures   Electronically signed by : SAVI Casey RD; Sep 12 2017 11:37AM EST                       (Author)    Electronically signed by : CRUZITO Talamantes ; Sep 14 2017  4:40PM EST                       (Validation)

## 2018-01-17 NOTE — MISCELLANEOUS
Message  7/28/2017 @ 1330 - post op follow up phone call completed  Pt is sipping liquids, using IS as instructed, reinforced importance of using IS to help prevent pneumonia  Ambulating about home without difficulty  Pain controlled with analgesia  Reaffirmed examples of liquid diet over the next week  Pt stated understanding about discharge instructions and medication adjustments  Pt educated [place] [address] [facility]  Follow up appt with surgeon scheduled for next week  Instructed to call with any additional questions or concerns  Active Problems    1  Blood tests prior to treatment or procedure (V72 63) (Z01 812)   2  Fatigue (780 79) (R53 83)   3  Heartburn (787 1) (R12)   4  Heat intolerance (780 99) (R68 89)   5  History of laparoscopic adjustable gastric banding (V45 86) (Z98 84)   6  Hot flashes (627 2) (N95 1)   7  Irritable bowel syndrome (564 1) (K58 9)   8  Migraine, ophthalmoplegic (346 20) (G43 B0)   9  Morbid obesity (278 01) (E66 01)   10  Preop cardiovascular exam (V72 81) (Z01 810)   11  Stopped smoking between 1 and 3 months ago (V15 82) (Z87 891)   12  Tachycardia (785 0) (R00 0)   13  Vitamin B12 deficiency (266 2) (E53 8)   14  Vitamin D deficiency (268 9) (E55 9)    Current Meds   1  Daily Multiple Vitamins TABS; Therapy: (Recorded:64Cxr7473) to Recorded   2  Omeprazole 20 MG Oral Capsule Delayed Release; TAKE 1 CAPSULE DAILY EVERY   MORNING BEFORE BREAKFAST; Therapy: 42IIL4263 to (Melanie Hastings)  Requested for: 58OFY0858; Last   Rx:54Nvr2903 Ordered   3  Oxycodone-Acetaminophen 5-325 MG Oral Tablet (Percocet); TAKE 1 TABLET EVERY 4   TO 6 HOURS AS NEEDED FOR PAIN;   Therapy: 91WMP0766 to (Evaluate:05Oqx8990); Last Rx:04Pwz2601 Ordered   4  Vitamin D (Ergocalciferol) 95028 UNIT Oral Capsule; Take 1 capsule twice weekly for 12   weeks; Therapy: 07DOI0686 to (Last Rx:24Mar2017)  Requested for: 24Mar2017 Ordered    Allergies    1   No Known Drug Allergies    Signatures Electronically signed by : Adolfo Bryant, ; Jul 28 2017  1:33PM EST                       (Author)

## 2018-01-22 VITALS
WEIGHT: 237.38 LBS | OXYGEN SATURATION: 94 % | DIASTOLIC BLOOD PRESSURE: 70 MMHG | TEMPERATURE: 98.1 F | HEART RATE: 78 BPM | RESPIRATION RATE: 16 BRPM | SYSTOLIC BLOOD PRESSURE: 120 MMHG | BODY MASS INDEX: 43.68 KG/M2 | HEIGHT: 62 IN

## 2018-01-29 ENCOUNTER — TRANSCRIBE ORDERS (OUTPATIENT)
Dept: LAB | Facility: CLINIC | Age: 33
End: 2018-01-29

## 2018-01-29 ENCOUNTER — APPOINTMENT (OUTPATIENT)
Dept: LAB | Facility: CLINIC | Age: 33
End: 2018-01-29
Payer: COMMERCIAL

## 2018-01-29 DIAGNOSIS — Z98.84 BARIATRIC SURGERY STATUS: ICD-10-CM

## 2018-01-29 DIAGNOSIS — R12 HEARTBURN: ICD-10-CM

## 2018-01-29 DIAGNOSIS — K91.2 POSTSURGICAL MALABSORPTION, NOT ELSEWHERE CLASSIFIED (CODE): ICD-10-CM

## 2018-01-29 DIAGNOSIS — E55.9 VITAMIN D DEFICIENCY: ICD-10-CM

## 2018-01-29 LAB
25(OH)D3 SERPL-MCNC: 23.3 NG/ML (ref 30–100)
ALBUMIN SERPL BCP-MCNC: 3.7 G/DL (ref 3.5–5)
ALP SERPL-CCNC: 93 U/L (ref 46–116)
ALT SERPL W P-5'-P-CCNC: 17 U/L (ref 12–78)
ANION GAP SERPL CALCULATED.3IONS-SCNC: 6 MMOL/L (ref 4–13)
AST SERPL W P-5'-P-CCNC: 13 U/L (ref 5–45)
BILIRUB SERPL-MCNC: 0.43 MG/DL (ref 0.2–1)
BUN SERPL-MCNC: 8 MG/DL (ref 5–25)
CALCIUM SERPL-MCNC: 9.2 MG/DL (ref 8.3–10.1)
CHLORIDE SERPL-SCNC: 103 MMOL/L (ref 100–108)
CHOLEST SERPL-MCNC: 128 MG/DL (ref 50–200)
CO2 SERPL-SCNC: 30 MMOL/L (ref 21–32)
CREAT SERPL-MCNC: 0.59 MG/DL (ref 0.6–1.3)
ERYTHROCYTE [DISTWIDTH] IN BLOOD BY AUTOMATED COUNT: 12.8 % (ref 11.6–15.1)
FERRITIN SERPL-MCNC: 97 NG/ML (ref 8–388)
FOLATE SERPL-MCNC: 4.2 NG/ML (ref 3.1–17.5)
GFR SERPL CREATININE-BSD FRML MDRD: 123 ML/MIN/1.73SQ M
GLUCOSE P FAST SERPL-MCNC: 75 MG/DL (ref 65–99)
HCT VFR BLD AUTO: 40.9 % (ref 34.8–46.1)
HDLC SERPL-MCNC: 50 MG/DL (ref 40–60)
HGB BLD-MCNC: 14.3 G/DL (ref 11.5–15.4)
LDLC SERPL CALC-MCNC: 58 MG/DL (ref 0–100)
MCH RBC QN AUTO: 32.1 PG (ref 26.8–34.3)
MCHC RBC AUTO-ENTMCNC: 35 G/DL (ref 31.4–37.4)
MCV RBC AUTO: 92 FL (ref 82–98)
PLATELET # BLD AUTO: 205 THOUSANDS/UL (ref 149–390)
PMV BLD AUTO: 11.2 FL (ref 8.9–12.7)
POTASSIUM SERPL-SCNC: 3.6 MMOL/L (ref 3.5–5.3)
PROT SERPL-MCNC: 7.4 G/DL (ref 6.4–8.2)
PTH-INTACT SERPL-MCNC: 32.5 PG/ML (ref 14–72)
RBC # BLD AUTO: 4.45 MILLION/UL (ref 3.81–5.12)
SODIUM SERPL-SCNC: 139 MMOL/L (ref 136–145)
TRIGL SERPL-MCNC: 102 MG/DL
VIT B12 SERPL-MCNC: 337 PG/ML (ref 100–900)
WBC # BLD AUTO: 6.9 THOUSAND/UL (ref 4.31–10.16)

## 2018-01-29 PROCEDURE — 84425 ASSAY OF VITAMIN B-1: CPT

## 2018-01-29 PROCEDURE — 82746 ASSAY OF FOLIC ACID SERUM: CPT

## 2018-01-29 PROCEDURE — 82306 VITAMIN D 25 HYDROXY: CPT

## 2018-01-29 PROCEDURE — 83970 ASSAY OF PARATHORMONE: CPT

## 2018-01-29 PROCEDURE — 80053 COMPREHEN METABOLIC PANEL: CPT

## 2018-01-29 PROCEDURE — 82728 ASSAY OF FERRITIN: CPT

## 2018-01-29 PROCEDURE — 85027 COMPLETE CBC AUTOMATED: CPT

## 2018-01-29 PROCEDURE — 36415 COLL VENOUS BLD VENIPUNCTURE: CPT

## 2018-01-29 PROCEDURE — 84590 ASSAY OF VITAMIN A: CPT

## 2018-01-29 PROCEDURE — 82607 VITAMIN B-12: CPT

## 2018-01-29 PROCEDURE — 80061 LIPID PANEL: CPT

## 2018-02-01 LAB — VIT B1 BLD-SCNC: 78.6 NMOL/L (ref 66.5–200)

## 2018-02-02 LAB — VIT A SERPL-MCNC: 38 UG/DL (ref 20–65)

## 2018-02-06 RX ORDER — LANOLIN ALCOHOL/MO/W.PET/CERES
CREAM (GRAM) TOPICAL DAILY
COMMUNITY
End: 2020-11-02

## 2018-02-06 RX ORDER — OMEPRAZOLE 20 MG/1
1 CAPSULE, DELAYED RELEASE ORAL DAILY
COMMUNITY
Start: 2017-07-06 | End: 2018-02-09 | Stop reason: ALTCHOICE

## 2018-02-06 RX ORDER — FLUTICASONE PROPIONATE 50 MCG
2 SPRAY, SUSPENSION (ML) NASAL DAILY
COMMUNITY
Start: 2017-11-06 | End: 2018-02-09 | Stop reason: ALTCHOICE

## 2018-02-06 RX ORDER — MULTIVITAMIN/IRON/FOLIC ACID 18MG-0.4MG
TABLET ORAL DAILY
COMMUNITY
End: 2020-11-02

## 2018-02-09 ENCOUNTER — OFFICE VISIT (OUTPATIENT)
Dept: BARIATRICS | Facility: CLINIC | Age: 33
End: 2018-02-09
Payer: COMMERCIAL

## 2018-02-09 VITALS
SYSTOLIC BLOOD PRESSURE: 128 MMHG | WEIGHT: 179 LBS | HEIGHT: 62 IN | BODY MASS INDEX: 32.94 KG/M2 | DIASTOLIC BLOOD PRESSURE: 82 MMHG | RESPIRATION RATE: 18 BRPM | HEART RATE: 99 BPM | TEMPERATURE: 98.8 F

## 2018-02-09 DIAGNOSIS — K91.2 POSTSURGICAL MALABSORPTION: ICD-10-CM

## 2018-02-09 DIAGNOSIS — Z98.84 BARIATRIC SURGERY STATUS: ICD-10-CM

## 2018-02-09 DIAGNOSIS — E55.9 VITAMIN D INSUFFICIENCY: ICD-10-CM

## 2018-02-09 DIAGNOSIS — K21.9 GASTROESOPHAGEAL REFLUX DISEASE WITHOUT ESOPHAGITIS: Primary | ICD-10-CM

## 2018-02-09 DIAGNOSIS — K91.2 POSTGASTRECTOMY MALABSORPTION: ICD-10-CM

## 2018-02-09 DIAGNOSIS — E53.8 LOW SERUM VITAMIN B12: ICD-10-CM

## 2018-02-09 DIAGNOSIS — Z90.3 POSTGASTRECTOMY MALABSORPTION: ICD-10-CM

## 2018-02-09 PROBLEM — E66.9 OBESITY (BMI 30-39.9): Status: RESOLVED | Noted: 2017-11-08 | Resolved: 2018-02-09

## 2018-02-09 PROBLEM — E66.9 OBESITY (BMI 30-39.9): Status: ACTIVE | Noted: 2017-11-08

## 2018-02-09 PROCEDURE — 99214 OFFICE O/P EST MOD 30 MIN: CPT | Performed by: PHYSICIAN ASSISTANT

## 2018-02-09 RX ORDER — OMEPRAZOLE 20 MG/1
20 CAPSULE, DELAYED RELEASE ORAL DAILY
Qty: 30 CAPSULE | Refills: 5 | Status: SHIPPED | OUTPATIENT
Start: 2018-02-09 | End: 2018-04-04 | Stop reason: ALTCHOICE

## 2018-02-09 NOTE — ASSESSMENT & PLAN NOTE
Patient was able to stop her ppi without any heart burn or abdominal upset  She notes her  does smoke but not in the home  Advised that second-hand smoke can cause her gastric irritation/ulcer  As long as she is feeling ok and she is avoiding being around it will just monitor for now    Advised if she needs ppi she should take it-I will give her a refill to have on hand

## 2018-02-09 NOTE — PATIENT INSTRUCTIONS
Follow-up in 6 months  Follow diet as discussed  Get lab work done prior to next office visit  It is recommended to check with your insurance BEFORE getting labs done to make sure they are covered by your policy  Make sure to HOLD any multivitamins that may contain biotin and any biotin supplements FOR 5 DAYS before any labs since it can affect the results  Follow vitamin  and mineral recommendations as reviewed with you  Exercise as tolerated    Call our office if you have any problems with abdominal pain especially associated with fever, chills, nausea, vomiting or any other concerns  All  Post-bariatric surgery patients should be aware that very small quantities of any alcohol  can cause impairment and it is very possible not to feel the effect  The effect can be in the system for several hours  It is also a stomach irritant  It is advised to AVOID alcohol, Nonsteroidal antiinflammatory drugs (NSAIDS) and nicotine of all forms   Any of these can cause stomach irritation/pain  A prescription for omeprazole 20 mg daily as needed has been sent to your pharmacy  Please take this if you have regular exposure to second-hand smoke or have abdominal irritation

## 2018-02-09 NOTE — ASSESSMENT & PLAN NOTE
-At risk for malabsorption of vitamins/minerals secondary to malabsorption and restriction of intake from their procedure  -Currently taking adequate postop bariatric surgery vitamin supplementation-no  -Last set of bariatric labs completed on February 2018 and are not within acceptable limits   -Next set of bariatric labs ordered for approximately 6 months     She is currently taking 2 celebrate multivitamins and 3 -500 mg calcium -  Advised on recent labs and adjustments

## 2018-02-09 NOTE — ASSESSMENT & PLAN NOTE
-s/p Fe-En-Y Gastric Bypass with Dr Pascual English on 7/25/2017  Overall doing Well  Initial: 256 lb  Current: 179 lb  EWL: 64% which is above average for this time frame  Chris: current weight  Current BMI is Body mass index is 32 74 kg/m²      Tolerating a regular diet-yes  Eating at least 60 grams of protein per day-yes  Following 30/60 minute rule with liquids-yes  Drinking at least 64 ounces of fluid per day-yes  Drinking carbonated beverages-no  Sufficient exercise-yesno  Using NSAIDs regularly-no  Using nicotine-no-her  smokes and advised she should also avoid second-hand smoke exposure-she notes he does not smoke around her-she was able to come off her ppi but I will renew for her and advised if she needs it prn she should take it  Using alcohol-no

## 2018-02-09 NOTE — ASSESSMENT & PLAN NOTE
Improved from vitamin D deficiency-advised to take an EXTRA 2000 IU vitamin D3 daily-this will be rechecked with routine labs

## 2018-02-09 NOTE — PROGRESS NOTES
Assessment/Plan:    Obesity  Improved- she has lost 16 lb down from her last visit at 3 months  Postgastrectomy malabsorption  -At risk for malabsorption of vitamins/minerals secondary to malabsorption and restriction of intake from their procedure  -Currently taking adequate postop bariatric surgery vitamin supplementation-no  -Last set of bariatric labs completed on February 2018 and are not within acceptable limits   -Next set of bariatric labs ordered for approximately 6 months     She is currently taking 2 celebrate multivitamins and 3 -500 mg calcium -  Advised on recent labs and adjustments      Low serum vitamin B12  Advised on adding an EXTRA 500 mcg sublingual vitamin B12 daily    GERD (gastroesophageal reflux disease)  Patient was able to stop her ppi without any heart burn or abdominal upset  She notes her  does smoke but not in the home  Advised that second-hand smoke can cause her gastric irritation/ulcer  As long as she is feeling ok and she is avoiding being around it will just monitor for now  Advised if she needs ppi she should take it-I will give her a refill to have on hand    Obesity (BMI 30-39  9)  Obesity improved-with 16 lb weight loss since her 3-month office visit  Bariatric surgery status  -s/p Fe-En-Y Gastric Bypass with Dr Andrea England on 7/25/2017  Overall doing Well  Initial: 256 lb  Current: 179 lb  EWL: 64% which is above average for this time frame  Chris: current weight  Current BMI is Body mass index is 32 74 kg/m²      Tolerating a regular diet-yes  Eating at least 60 grams of protein per day-yes  Following 30/60 minute rule with liquids-yes  Drinking at least 64 ounces of fluid per day-yes  Drinking carbonated beverages-no  Sufficient exercise-yesno  Using NSAIDs regularly-no  Using nicotine-no-her  smokes and advised she should also avoid second-hand smoke exposure-she notes he does not smoke around her-she was able to come off her ppi but I will renew for her and advised if she needs it prn she should take it  Using alcohol-no    Vitamin D insufficiency  Improved from vitamin D deficiency-advised to take an EXTRA 2000 IU vitamin D3 daily-this will be rechecked with routine labs  Diagnoses and all orders for this visit:    Bariatric surgery status  -     Comprehensive metabolic panel; Future  -     CBC and differential; Future  -     Vitamin B12; Future  -     Vitamin A; Future  -     PTH, intact; Future  -     Vitamin B1, whole blood; Future  -     Vitamin D 25 hydroxy; Future  -     Folate; Future  -     Ferritin; Future  -     Iron Saturation %; Future    Postsurgical malabsorption  -     Comprehensive metabolic panel; Future  -     CBC and differential; Future  -     Vitamin B12; Future  -     Vitamin A; Future  -     PTH, intact; Future  -     Vitamin B1, whole blood; Future  -     Vitamin D 25 hydroxy; Future  -     Folate; Future  -     Ferritin; Future  -     Iron Saturation %; Future    Vitamin D insufficiency  -     Comprehensive metabolic panel; Future  -     CBC and differential; Future  -     Vitamin B12; Future  -     Vitamin A; Future  -     PTH, intact; Future  -     Vitamin B1, whole blood; Future  -     Vitamin D 25 hydroxy; Future  -     Folate; Future  -     Ferritin; Future  -     Iron Saturation %; Future    Low serum vitamin B12  -     Comprehensive metabolic panel; Future  -     CBC and differential; Future  -     Vitamin B12; Future  -     Vitamin A; Future  -     PTH, intact; Future  -     Vitamin B1, whole blood; Future  -     Vitamin D 25 hydroxy; Future  -     Folate; Future  -     Ferritin; Future  -     Iron Saturation %;  Future    Postgastrectomy malabsorption    Gastroesophageal reflux disease without esophagitis    Other orders  -     Calcium 500-100 MG-UNIT CHEW; Chew  -     DAILY MULTIPLE VITAMINS/IRON TABS; Take by mouth  -     Discontinue: fluticasone (FLONASE) 50 mcg/act nasal spray; 2 sprays into each nostril daily  - Discontinue: omeprazole (PriLOSEC) 20 mg delayed release capsule; Take 1 capsule by mouth Daily          Subjective:      Patient ID: Sebastian Tierney is a 33 y o  female  HPI    The following portions of the patient's history were reviewed and updated as appropriate: allergies, current medications, past family history, past medical history, past social history, past surgical history and problem list     Review of Systems   Constitutional: Negative for chills and fever  Unexpected weight change: planned weight loss  Respiratory: Negative for shortness of breath and wheezing  Cardiovascular: Negative for chest pain and palpitations  Gastrointestinal: Negative for abdominal pain, constipation, diarrhea, nausea and vomiting  Psychiatric/Behavioral: Suicidal ideas: no complait of anxiety or depression  Objective:    Vitals:    02/09/18 0939   BP: 128/82   Pulse: 99   Resp: 18   Temp: 98 8 °F (37 1 °C)        Physical Exam   Constitutional: She is oriented to person, place, and time  She appears well-developed and well-nourished  HENT:   Mouth/Throat: Oropharynx is clear and moist    Eyes: Conjunctivae are normal  No scleral icterus  Cardiovascular: Normal rate and regular rhythm  Pulmonary/Chest: Effort normal and breath sounds normal    Abdominal: Soft  There is no tenderness  No incisional hernias appreciated   Musculoskeletal:   Normal gait   Neurological: She is alert and oriented to person, place, and time  Psychiatric: She has a normal mood and affect   Her behavior is normal  Judgment and thought content normal         Maintain weight loss +/- continued weight losswith good nutrition intakes  Normal vitamin and mineral levels  Exercise as tolerated

## 2018-04-04 ENCOUNTER — OFFICE VISIT (OUTPATIENT)
Dept: FAMILY MEDICINE CLINIC | Facility: CLINIC | Age: 33
End: 2018-04-04
Payer: COMMERCIAL

## 2018-04-04 VITALS
OXYGEN SATURATION: 98 % | RESPIRATION RATE: 17 BRPM | HEIGHT: 62 IN | DIASTOLIC BLOOD PRESSURE: 80 MMHG | WEIGHT: 169 LBS | BODY MASS INDEX: 31.1 KG/M2 | SYSTOLIC BLOOD PRESSURE: 120 MMHG | HEART RATE: 82 BPM | TEMPERATURE: 97.5 F

## 2018-04-04 DIAGNOSIS — Z71.6 ENCOUNTER FOR SMOKING CESSATION COUNSELING: Primary | ICD-10-CM

## 2018-04-04 DIAGNOSIS — Z72.0 TOBACCO ABUSE: ICD-10-CM

## 2018-04-04 PROCEDURE — 99213 OFFICE O/P EST LOW 20 MIN: CPT | Performed by: PHYSICIAN ASSISTANT

## 2018-04-04 RX ORDER — VARENICLINE TARTRATE 0.5 MG/1
TABLET, FILM COATED ORAL
Qty: 30 TABLET | Refills: 0 | Status: SHIPPED | OUTPATIENT
Start: 2018-04-04 | End: 2018-06-28

## 2018-04-04 NOTE — PROGRESS NOTES
Routine Follow-up    Nicanor Wilkinson [facility] 33 y o  female   Date:  4/4/2018      Assessment and Plan:    Everett Montalvo was seen today for follow-up  Diagnoses and all orders for this visit:    Encounter for smoking cessation counseling  -     varenicline (CHANTIX) 0 5 mg tablet; Start 1 tab daily x 3 days, then 1 tab twice daily x 4 days, then increase to 2 tabs twice daily x 3 months  Tobacco abuse  -     varenicline (CHANTIX) 0 5 mg tablet; Start 1 tab daily x 3 days, then 1 tab twice daily x 4 days, then increase to 2 tabs twice daily x 3 months  - set a quit date  - follow up in 3 months   - if do not tolerate, can trial nicotine gum or wellbutrin  - she tolerated chantix in the past and would like to try again         HPI:  Chief Complaint   Patient presents with    Follow-up     HPI   Patient is a healthy 32 yo female with PMH below who presents for smoking cessation  She quit smoking around the time of gastric sleeve surgery  However, since July she started smoking again, here and there  Now she is back up to smoking 1 pack per day  She feels for her it is a stress relief as she is trying to juggle school and her children  In the past, she tried patches but gave her an itchy rash and was even alternating location of the patch  She did tried chantix back in 2014 and tolerated it well but did not continue taking as her mom and her were trying to share a prescription  She is motivated to quit again  She wishes her  would quit with her but he refuses at this time  Review of Systems   Constitutional: Negative for chills, fatigue, fever and unexpected weight change  HENT: Negative for congestion, ear pain, hearing loss, nosebleeds, sore throat and trouble swallowing  Eyes: Negative for pain and discharge  Respiratory: Negative for cough, shortness of breath and wheezing  Cardiovascular: Negative for chest pain, palpitations and leg swelling     Gastrointestinal: Negative for abdominal pain, constipation, diarrhea, nausea and vomiting  Musculoskeletal: Negative for arthralgias, gait problem and myalgias  Skin: Negative for color change, rash and wound  Neurological: Negative for dizziness, syncope, weakness, light-headedness and headaches  Psychiatric/Behavioral: Negative for confusion and sleep disturbance  The patient is not nervous/anxious  Increased personal stressors         Past Medical History:   Diagnosis Date    Exercises 5 to 6 times per week     GERD (gastroesophageal reflux disease)     Headache     History of hypothyroidism     "told no longer has it"    Irritable bowel syndrome     Migraines     Obesity     Seasonal allergies     Vitamin B12 deficiency     Vitamin D deficiency     Wears glasses      Patient Active Problem List   Diagnosis    Vitamin D deficiency    Seasonal allergies    GERD (gastroesophageal reflux disease)    Postgastrectomy malabsorption    Low serum vitamin B12    Bariatric surgery status    Gastric band slippage    Vitamin B12 deficiency       Past Surgical History:   Procedure Laterality Date     SECTION      CHOLECYSTECTOMY      ESOPHAGOGASTRODUODENOSCOPY      HYSTERECTOMY      LAPAROSCOPIC GASTRIC BANDING  2010    DC EGD TRANSORAL BIOPSY SINGLE/MULTIPLE N/A 5/3/2017    Procedure: ESOPHAGOGASTRODUODENOSCOPY (EGD) with bx;  Surgeon: Stephanie Torres MD;  Location: [facility];   Service: Bariatrics    DC LAP GASTRIC BYPASS/BENJAMIN-EN-Y N/A 2017    Procedure: BYPASS GASTRIC  BENJAMIN-EN-Y LAPAROSCOPIC, intraoperative EGD;  Surgeon: Stephanie Torres MD;  Location: [facility];  Service: Bariatrics    REMOVAL GASTRIC BAND LAPAROSCOPIC  2015    SKIN BIOPSY         Social History     Social History    Marital status: /Civil Union     Spouse name: N/A    Number of children: N/A    Years of education: N/A     Social History Main Topics    Smoking status: Former Smoker     Quit date: 3/24/2017    Smokeless tobacco: Never Used  Alcohol use No    Drug use: No    Sexual activity: Not Asked     Other Topics Concern    None     Social History Narrative    None       Family History   Problem Relation Age of Onset    Atrial fibrillation Mother     Gallbladder disease Mother     No Known Problems Father        No Known Allergies      Current Outpatient Prescriptions:     Calcium 500-100 MG-UNIT CHEW, Chew, Disp: , Rfl:     DAILY MULTIPLE VITAMINS/IRON TABS, Take by mouth, Disp: , Rfl:     varenicline (CHANTIX) 0 5 mg tablet, Start 1 tab daily x 3 days, then 1 tab twice daily x 4 days, then increase to 2 tabs twice daily x 3 months , Disp: 30 tablet, Rfl: 0      Physical Exam:  /80 (BP Location: Left arm, Patient Position: Sitting, Cuff Size: Standard)   Pulse 82   Temp 97 5 °F (36 4 °C)   Resp 17   Ht 5' 1 81" (1 57 m)   Wt 76 7 kg (169 lb)   SpO2 98%   BMI 31 10 kg/m²     Physical Exam   Constitutional: She is oriented to person, place, and time  Vital signs are normal  She appears well-developed and well-nourished  No distress  HENT:   Head: Normocephalic and atraumatic  Right Ear: Tympanic membrane, external ear and ear canal normal    Left Ear: Tympanic membrane, external ear and ear canal normal    Nose: Nose normal    Mouth/Throat: Oropharynx is clear and moist  No oropharyngeal exudate  Eyes: Conjunctivae and lids are normal  Pupils are equal, round, and reactive to light  Right eye exhibits no discharge  Left eye exhibits no discharge  Neck: Trachea normal and normal range of motion  Neck supple  No thyromegaly present  Cardiovascular: Normal rate, regular rhythm, S1 normal, S2 normal, normal heart sounds and intact distal pulses  Exam reveals no gallop  No murmur heard  Pulmonary/Chest: Breath sounds normal  No respiratory distress  She has no wheezes  She has no rhonchi  She has no rales  Abdominal: Normal appearance  There is no hepatosplenomegaly     Lymphadenopathy:     She has no cervical adenopathy  Neurological: She is alert and oriented to person, place, and time  She has normal reflexes  No cranial nerve deficit or sensory deficit  Skin: Skin is warm and dry  No rash noted  No cyanosis  No pallor  Nails show no clubbing  Psychiatric: She has a normal mood and affect   Her behavior is normal  Cognition and memory are normal          Labs:  Lab Results   Component Value Date    WBC 6 90 01/29/2018    HGB 14 3 01/29/2018    HCT 40 9 01/29/2018    MCV 92 01/29/2018     01/29/2018     Lab Results   Component Value Date     01/29/2018    K 3 6 01/29/2018     01/29/2018    CO2 30 01/29/2018    ANIONGAP 6 01/29/2018    BUN 8 01/29/2018    CREATININE 0 59 (L) 01/29/2018    GLUCOSE 106 07/26/2017    GLUF 75 01/29/2018    CALCIUM 9 2 01/29/2018    AST 13 01/29/2018    ALT 17 01/29/2018    ALKPHOS 93 01/29/2018    PROT 7 4 01/29/2018    BILITOT 0 43 01/29/2018    EGFR 123 01/29/2018

## 2018-04-04 NOTE — PATIENT INSTRUCTIONS
How to Stop Smoking   AMBULATORY CARE:   You will improve your health and the health of others around you  if you stop smoking  Your risk for heart and lung disease, cancer, stroke, heart attack, and vision problems will also decrease  You can benefit from quitting no matter how long you have smoked  Prepare to stop smoking:  Nicotine is a highly addictive drug found in cigarettes  Withdrawal symptoms can happen when you stop smoking and make it hard to quit  These include anxiety, depression, irritability, trouble sleeping, and increased appetite  You increase your chances of success if you prepare to quit  · Set a quit date  [place] a date that is within the next 2 weeks  Do not pick a day that you think may be stressful or busy  Write down the day or [place] it on your calender  · Tell friends and family that you plan to quit  Explain that you may have withdrawal symptoms when you try to quit  Ask them to support you  They may be able to encourage you and help reduce your stress to make it easier for you to quit  · Make a list of your reasons for quitting  Put the list somewhere you will see it every day, such as your refrigerator  You can look at the list when you have a craving  · Remove all tobacco and nicotine products from your home, car, and workplace  Also, remove anything else that will tempt you to smoke, such as lighters, matches, or ashtrays  Clean your car, home, and places at work that smell like smoke  The smell of smoke can trigger a craving  · Identify triggers that make you want to smoke  This may include activities, feelings, or people  Also write down 1 way you can deal with each of your triggers  For example, if you want to smoke as soon as you wake up, plan another activity during this time, such as exercise  · Make a plan for how you will quit  Learn about the tools that can help you quit, such as medicine, counseling, or nicotine replacement therapy   Choose at least 2 options to help you quit  Tools to help you stop smoking:   · Counseling  from a trained healthcare provider can provide you with support and skills to quit smoking  The provider will also teach you to manage your withdrawal symptoms and cravings  You may receive counseling from one counselor, in group therapy, or through phone therapy called a quit line  · Nicotine replacement therapy (NRT)  such as nicotine patches, gum, or lozenges may help reduce your nicotine cravings  You may get these without a doctor's order  Do not use e-cigarettes or smokeless tobacco in place of cigarettes or to help you quit  They still contain nicotine  · Prescription medicines  such as nasal sprays or nicotine inhalers may help reduce your withdrawal symptoms  Other medicines may also be used to reduce your urge to smoke  Ask your healthcare provider about these medicines  You may need to start certain medicines 2 weeks before your quit date for them to work well  · Hypnosis  is a practice that helps guide you through thoughts and feelings  Hypnosis may help decrease your cravings and make you more willing to quit  · Acupuncture therapy  uses very thin needles to balance energy channels in the body  This is thought to help decrease cravings and symptoms of nicotine withdrawal      · Support groups  let you talk to others who are trying to quit or have already quit  It may be helpful to speak with others about how they quit  Manage your cravings:   · Avoid situations, people, and places that tempt you to smoke  Go to nonsmoking places, such as libraries or restaurants  Understand what tempts you and try to avoid these things  · Keep your hands busy  Hold things such as a stress ball or pen  · Put candy or toothpicks in your mouth  Keep lollipops, sugarless gum, or toothpicks with you at all times  · Do not have alcohol or caffeine  These drinks may tempt you to smoke   Drink healthy liquids such as water or juice instead  · Reward yourself when you resist your cravings  Rewards will motivate you and help you stay positive  · Do an activity that distracts you from your craving  Examples include going for a walk, exercising, or cleaning  Prevent weight gain after you quit:  You may gain a few pounds after you quit smoking  It is healthier for you to gain a few pounds than to continue to smoke  The following can help you prevent weight gain:  · Eat healthy foods  These include fruits, vegetables, whole-grain breads, low-fat dairy products, beans, lean meats, and fish  Eat healthy snacks, such as low-fat yogurt, if you get hungry between meals  · Drink water before, during, and between meals  This will make your stomach feel full and help prevent you from overeating  Ask your healthcare provider how much liquid to drink each day and which liquids are best for you  · Exercise  Take a walk or do some kind of exercise every day  Ask your healthcare provider what exercise is right for you  This may help reduce your cravings and reduce stress  For more support and information:   · [employer]  [place]  Phone: 7- 687 - 506-8801  Web Address: www [employer]  © 2017 [address] [employer] Information is for End User's use only and may not be sold, redistributed or otherwise used for commercial purposes  All illustrations and images included in CareNotes® are the copyrighted property of [employer]  or Jerel Medina  The above information is an  only  It is not intended as medical advice for individual conditions or treatments  Talk to your doctor, nurse or pharmacist before following any medical regimen to see if it is safe and effective for you

## 2018-04-20 ENCOUNTER — DOCUMENTATION (OUTPATIENT)
Dept: BARIATRICS | Facility: CLINIC | Age: 33
End: 2018-04-20

## 2018-04-20 NOTE — PROGRESS NOTES
Weight Management Nutrition Class     Diagnosis: Obesity    Bariatric Surgeon: Dr Broderick Apt    Surgery: Gastric Bypass Laparoscopic    Class: 9 month post op note    Topics discussed today include:     fluid goals post op, protein goals post op, chew food well, exercise, PPI use, diet progression, dumping syndrome, protein supplems, vitamin/mineral supplements and calcium supplements    Patient was able to verbalize basic diet (protein, fluid, vitamin and mineral) recommendations and possible nutrition-related complications  Yes     Attended 9 month follow up meeting  Addressed both behavioral and dietary issues  Group discussion included the impact of tobacco use, alcohol use, psychiatric medications, relationships, body image and self esteem issues as it pertains to the weight loss surgery patient  During group discussion, reviewed vitamin recommendations, protein recommendations, portion sizes, balancing diet to include healthy carbohydrates, importance of exercise, and the bariatric rules for success   Overall pleased with weight loss and improved quality of life

## 2018-05-25 ENCOUNTER — HOSPITAL ENCOUNTER (EMERGENCY)
Facility: HOSPITAL | Age: 33
Discharge: HOME | End: 2018-05-25
Payer: COMMERCIAL

## 2018-05-25 VITALS
TEMPERATURE: 98 F | WEIGHT: 150 LBS | HEART RATE: 91 BPM | RESPIRATION RATE: 16 BRPM | SYSTOLIC BLOOD PRESSURE: 124 MMHG | DIASTOLIC BLOOD PRESSURE: 66 MMHG | OXYGEN SATURATION: 99 % | BODY MASS INDEX: 27.6 KG/M2

## 2018-05-25 DIAGNOSIS — K21.9 GERD (GASTROESOPHAGEAL REFLUX DISEASE): ICD-10-CM

## 2018-05-25 DIAGNOSIS — Z98.84 BARIATRIC SURGERY STATUS: ICD-10-CM

## 2018-05-25 DIAGNOSIS — K92.0 HEMATEMESIS: Primary | ICD-10-CM

## 2018-05-25 LAB
ALBUMIN SERPL BCP-MCNC: 4.2 G/DL (ref 3.5–5)
ALP SERPL-CCNC: 110 U/L (ref 46–116)
ALT SERPL W P-5'-P-CCNC: 16 U/L (ref 12–78)
ANION GAP SERPL CALCULATED.3IONS-SCNC: 10 MMOL/L (ref 4–13)
AST SERPL W P-5'-P-CCNC: 17 U/L (ref 5–45)
BACTERIA UR QL AUTO: ABNORMAL /HPF
BASOPHILS # BLD AUTO: 0.01 THOUSANDS/ΜL (ref 0–0.1)
BASOPHILS NFR BLD AUTO: 0 % (ref 0–1)
BILIRUB SERPL-MCNC: 0.36 MG/DL (ref 0.2–1)
BILIRUB UR QL STRIP: ABNORMAL
BUN SERPL-MCNC: 8 MG/DL (ref 5–25)
CALCIUM SERPL-MCNC: 9.7 MG/DL (ref 8.3–10.1)
CHLORIDE SERPL-SCNC: 102 MMOL/L (ref 100–108)
CLARITY UR: ABNORMAL
CO2 SERPL-SCNC: 29 MMOL/L (ref 21–32)
COLOR UR: YELLOW
CREAT SERPL-MCNC: 0.7 MG/DL (ref 0.6–1.3)
EOSINOPHIL # BLD AUTO: 0.11 THOUSAND/ΜL (ref 0–0.61)
EOSINOPHIL NFR BLD AUTO: 1 % (ref 0–6)
ERYTHROCYTE [DISTWIDTH] IN BLOOD BY AUTOMATED COUNT: 12.7 % (ref 11.6–15.1)
EXT PREG TEST URINE: NEGATIVE
GFR SERPL CREATININE-BSD FRML MDRD: 116 ML/MIN/1.73SQ M
GLUCOSE SERPL-MCNC: 88 MG/DL (ref 65–140)
GLUCOSE UR STRIP-MCNC: NEGATIVE MG/DL
HCT VFR BLD AUTO: 42.1 % (ref 34.8–46.1)
HGB BLD-MCNC: 14.7 G/DL (ref 11.5–15.4)
HGB UR QL STRIP.AUTO: NEGATIVE
KETONES UR STRIP-MCNC: ABNORMAL MG/DL
LEUKOCYTE ESTERASE UR QL STRIP: NEGATIVE
LIPASE SERPL-CCNC: 100 U/L (ref 73–393)
LYMPHOCYTES # BLD AUTO: 2.22 THOUSANDS/ΜL (ref 0.6–4.47)
LYMPHOCYTES NFR BLD AUTO: 23 % (ref 14–44)
MCH RBC QN AUTO: 32.2 PG (ref 26.8–34.3)
MCHC RBC AUTO-ENTMCNC: 34.9 G/DL (ref 31.4–37.4)
MCV RBC AUTO: 92 FL (ref 82–98)
MONOCYTES # BLD AUTO: 0.47 THOUSAND/ΜL (ref 0.17–1.22)
MONOCYTES NFR BLD AUTO: 5 % (ref 4–12)
MUCOUS THREADS UR QL AUTO: ABNORMAL
NEUTROPHILS # BLD AUTO: 6.76 THOUSANDS/ΜL (ref 1.85–7.62)
NEUTS SEG NFR BLD AUTO: 71 % (ref 43–75)
NITRITE UR QL STRIP: NEGATIVE
NON-SQ EPI CELLS URNS QL MICRO: ABNORMAL /HPF
NRBC BLD AUTO-RTO: 0 /100 WBCS
PH UR STRIP.AUTO: 5.5 [PH] (ref 4.5–8)
PLATELET # BLD AUTO: 190 THOUSANDS/UL (ref 149–390)
PMV BLD AUTO: 11.3 FL (ref 8.9–12.7)
POTASSIUM SERPL-SCNC: 3.7 MMOL/L (ref 3.5–5.3)
PROT SERPL-MCNC: 8.3 G/DL (ref 6.4–8.2)
PROT UR STRIP-MCNC: ABNORMAL MG/DL
RBC # BLD AUTO: 4.56 MILLION/UL (ref 3.81–5.12)
RBC #/AREA URNS AUTO: ABNORMAL /HPF
SODIUM SERPL-SCNC: 141 MMOL/L (ref 136–145)
SP GR UR STRIP.AUTO: >=1.03 (ref 1–1.03)
UROBILINOGEN UR QL STRIP.AUTO: 1 E.U./DL
WBC # BLD AUTO: 9.57 THOUSAND/UL (ref 4.31–10.16)
WBC #/AREA URNS AUTO: ABNORMAL /HPF

## 2018-05-25 PROCEDURE — 81025 URINE PREGNANCY TEST: CPT | Performed by: PHYSICIAN ASSISTANT

## 2018-05-25 PROCEDURE — 80053 COMPREHEN METABOLIC PANEL: CPT | Performed by: PHYSICIAN ASSISTANT

## 2018-05-25 PROCEDURE — 36415 COLL VENOUS BLD VENIPUNCTURE: CPT | Performed by: PHYSICIAN ASSISTANT

## 2018-05-25 PROCEDURE — 85025 COMPLETE CBC W/AUTO DIFF WBC: CPT | Performed by: PHYSICIAN ASSISTANT

## 2018-05-25 PROCEDURE — 99284 EMERGENCY DEPT VISIT MOD MDM: CPT

## 2018-05-25 PROCEDURE — 81001 URINALYSIS AUTO W/SCOPE: CPT

## 2018-05-25 PROCEDURE — 83690 ASSAY OF LIPASE: CPT | Performed by: PHYSICIAN ASSISTANT

## 2018-05-25 RX ORDER — OMEPRAZOLE 20 MG/1
20 CAPSULE, DELAYED RELEASE ORAL 2 TIMES DAILY
Qty: 30 CAPSULE | Refills: 0 | Status: SHIPPED | OUTPATIENT
Start: 2018-05-25 | End: 2018-11-21

## 2018-05-25 RX ORDER — PANTOPRAZOLE SODIUM 20 MG/1
20 TABLET, DELAYED RELEASE ORAL ONCE
Status: COMPLETED | OUTPATIENT
Start: 2018-05-25 | End: 2018-05-25

## 2018-05-25 RX ADMIN — PANTOPRAZOLE SODIUM 20 MG: 20 TABLET, DELAYED RELEASE ORAL at 23:18

## 2018-05-26 NOTE — DISCHARGE INSTRUCTIONS
Full Liquid Diet   AMBULATORY CARE:   A full liquid diet  includes only liquids and foods that are liquid at room temperature  You may need to follow this diet if you have trouble chewing or swallowing  You may also need to follow this diet if you have a severe intestinal illness or had surgery on your intestine  Your healthcare provider will tell you how long to follow this diet and when to start solid foods  Liquids and foods to include:   · Fruit juices or pureed fruit without seeds    · Vegetable juices or pureed vegetables in broth     · Broth or strained cream soups    · Refined and strained cooked cereals thinned with milk or half-and-half creamer    · Flavored gelatin without chunks of fruit    · Plain ice cream, milk shakes, sherbet, or popsicles    · Yogurt, pudding, or soft custard    · Milk or liquid nutrition supplements    · Coffee, tea, sodas, water, or sports drinks    · Butter, margarine, or cream  Other things you should know about a full liquid diet:   · You may need nutrition supplements  if you will be on this diet for more than 5 to 7 days  The full liquid diet does not provide all the nutrients, vitamins, minerals, or calories that your body needs  · You may need other diet changes  if you have another medical condition such as diabetes, high blood pressure, or heart disease  For example, you may need to choose sugar-free, low-sodium, or low-fat foods as part of your full liquid diet  · Choose lactose-free liquids if you are lactose intolerant  Examples include acidophilus milk, lactose-free milk, soy milk, or lactose-free liquid nutrition supplements  © 2017 [address] [employer] Information is for End User's use only and may not be sold, redistributed or otherwise used for commercial purposes  All illustrations and images included in CareNotes® are the copyrighted property of [employer]  or Jerel Medina  The above information is an  only   It is not intended as medical advice for individual conditions or treatments  Talk to your doctor, nurse or pharmacist before following any medical regimen to see if it is safe and effective for you

## 2018-05-26 NOTE — ED PROVIDER NOTES
History  Chief Complaint   Patient presents with    Vomiting Blood     Pt reports was in softball game, experienced pain in able to started to throw up blood  PT reports still having epigatric pain  Reports vomit looked like coffee grounds       Vomiting   Severity:  Mild  Duration: One time  Timing: One time  Quality:  Coffee grounds  Able to tolerate:  Liquids  Progression:  Improving  Chronicity:  New  Recent urination:  Normal  Context comment:  Symptoms of nausea  Relieved by:  Nothing  Worsened by:  Nothing  Ineffective treatments:  None tried  Associated symptoms: no chills, no cough, no diarrhea, no fever, no headaches, no sore throat and no URI    Associated symptoms comment:  Mild epigastric discomfort  Risk factors: no alcohol use        Prior to Admission Medications   Prescriptions Last Dose Informant Patient Reported? Taking? Calcium 500-100 MG-UNIT CHEW   Yes No   Sig: Chew   DAILY MULTIPLE VITAMINS/IRON TABS   Yes No   Sig: Take by mouth   varenicline (CHANTIX) 0 5 mg tablet   No No   Sig: Start 1 tab daily x 3 days, then 1 tab twice daily x 4 days, then increase to 2 tabs twice daily x 3 months        Facility-Administered Medications: None       Past Medical History:   Diagnosis Date    Dysfunctional uterine bleeding     last assessed: 13    Elevated blood pressure reading     last assessed: 16    Exercises 5 to 6 times per week     Fall     GERD (gastroesophageal reflux disease)     Headache     History of bone cyst     cyst of joint of ankle or foot, last assessed: 05/05/15    History of hypothyroidism     "told no longer has it"    Irritable bowel syndrome     Migraines     Obesity     Palpitations     last assessed: 16    Seasonal allergies     Skin lesion     Tachycardia     last assessed: 17    Vitamin B12 deficiency     Vitamin D deficiency     Wears glasses        Past Surgical History:   Procedure Laterality Date     SECTION      CHOLECYSTECTOMY      ESOPHAGOGASTRODUODENOSCOPY  2010    GALLBLADDER SURGERY      HYSTERECTOMY      LAPAROSCOPIC GASTRIC BANDING  2010    GA EGD TRANSORAL BIOPSY SINGLE/MULTIPLE N/A 5/3/2017    Procedure: ESOPHAGOGASTRODUODENOSCOPY (EGD) with bx;  Surgeon: Samm Hare MD;  Location: [facility]; Service: Bariatrics    GA LAP GASTRIC BYPASS/BENJAMIN-EN-Y N/A 7/25/2017    Procedure: BYPASS GASTRIC  BENJAMIN-EN-Y LAPAROSCOPIC, intraoperative EGD;  Surgeon: Samm Hare MD;  Location: [facility];  Service: Bariatrics    REMOVAL GASTRIC BAND LAPAROSCOPIC  2015    SKIN BIOPSY         Family History   Problem Relation Age of Onset    Atrial fibrillation Mother     Gallbladder disease Mother     No Known Problems Father     Hypertension Brother     Diabetes Family     Hypertension Family      I have reviewed and agree with the history as documented  Social History   Substance Use Topics    Smoking status: Former Smoker     Quit date: 3/24/2017    Smokeless tobacco: Never Used    Alcohol use No        Review of Systems   Constitutional: Negative for chills and fever  HENT: Negative for sore throat  Eyes: Negative for pain  Respiratory: Negative for cough  Gastrointestinal: Positive for vomiting  Negative for diarrhea  Endocrine: Negative for polydipsia  Genitourinary: Negative for dysuria  Musculoskeletal: Negative for back pain  Skin: Negative for pallor  Allergic/Immunologic: Negative for food allergies  Neurological: Negative for headaches  Hematological: Does not bruise/bleed easily  Psychiatric/Behavioral: Negative for behavioral problems  Physical Exam  Physical Exam   Constitutional: She appears well-developed and well-nourished  HENT:   Head: Normocephalic and atraumatic  Eyes: Conjunctivae are normal    Neck: Neck supple  No JVD present  Cardiovascular: Normal rate  Pulmonary/Chest: Effort normal    Abdominal: Soft  There is tenderness  Obese abdomen  Mild tenderness at the epigastric area  Genitourinary:   Genitourinary Comments: No complaints deferred  Musculoskeletal: She exhibits no edema or deformity  Neurological: She is alert  Skin: Skin is warm and dry  Capillary refill takes less than 2 seconds  Psychiatric: She has a normal mood and affect  Vital Signs  ED Triage Vitals [05/25/18 1924]   Temperature Pulse Respirations Blood Pressure SpO2   98 °F (36 7 °C) 95 16 133/69 98 %      Temp Source Heart Rate Source Patient Position - Orthostatic VS BP Location FiO2 (%)   Temporal Monitor Lying Right arm --      Pain Score       5           Vitals:    05/25/18 1924 05/25/18 2143 05/25/18 2316   BP: 133/69 121/61 124/66   Pulse: 95 94 91   Patient Position - Orthostatic VS: Lying         Visual Acuity      ED Medications  Medications   pantoprazole (PROTONIX) EC tablet 20 mg (20 mg Oral Given 5/25/18 2318)       Diagnostic Studies  Results Reviewed     Procedure Component Value Units Date/Time    Comprehensive metabolic panel [[id number]]  (Abnormal) Collected:  05/25/18 2114    Lab Status:  Final result Specimen:  Blood from Arm, Right Updated:  05/25/18 2146     Sodium 141 mmol/L      Potassium 3 7 mmol/L      Chloride 102 mmol/L      CO2 29 mmol/L      Anion Gap 10 mmol/L      BUN 8 mg/dL      Creatinine 0 70 mg/dL      Glucose 88 mg/dL      Calcium 9 7 mg/dL      AST 17 U/L      ALT 16 U/L      Alkaline Phosphatase 110 U/L      Total Protein 8 3 (H) g/dL      Albumin 4 2 g/dL      Total Bilirubin 0 36 mg/dL      eGFR 116 ml/min/1 73sq m     Narrative:         National Kidney Disease Education Program recommendations are as follows:  GFR calculation is accurate only with a steady state creatinine  Chronic Kidney disease less than 60 ml/min/1 73 sq  meters  Kidney failure less than 15 ml/min/1 73 sq  meters      Lipase [[id number]]  (Normal) Collected:  05/25/18 2114    Lab Status:  Final result Specimen:  Blood from Arm, Right Updated:  05/25/18 2146     Lipase 100 u/L     Urine Microscopic [[id number]]  (Abnormal) Collected:  05/25/18 2048    Lab Status:  Final result Specimen:  Urine from Urine, Clean Catch Updated:  05/25/18 2128     RBC, UA 0-1 (A) /hpf      WBC, UA 2-4 (A) /hpf      Epithelial Cells Occasional /hpf      Bacteria, UA Moderate (A) /hpf      MUCOUS THREADS Moderate (A)    CBC and differential [[id number]] Collected:  05/25/18 2114    Lab Status:  Final result Specimen:  Blood from Arm, Right Updated:  05/25/18 2121     WBC 9 57 Thousand/uL      RBC 4 56 Million/uL      Hemoglobin 14 7 g/dL      Hematocrit 42 1 %      MCV 92 fL      MCH 32 2 pg      MCHC 34 9 g/dL      RDW 12 7 %      MPV 11 3 fL      Platelets 390 Thousands/uL      nRBC 0 /100 WBCs      Neutrophils Relative 71 %      Lymphocytes Relative 23 %      Monocytes Relative 5 %      Eosinophils Relative 1 %      Basophils Relative 0 %      Neutrophils Absolute 6 76 Thousands/µL      Lymphocytes Absolute 2 22 Thousands/µL      Monocytes Absolute 0 47 Thousand/µL      Eosinophils Absolute 0 11 Thousand/µL      Basophils Absolute 0 01 Thousands/µL     POCT urinalysis dipstick [[id number]]  (Abnormal) Resulted:  05/25/18 2049    Lab Status:  Final result Specimen:  Urine Updated:  05/25/18 2049    POCT pregnancy, urine [[id number]]  (Normal) Resulted:  05/25/18 2049    Lab Status:  Final result Updated:  05/25/18 2049     EXT PREG TEST UR (Ref: Negative) negative    ED Urine Macroscopic [[id number]]  (Abnormal) Collected:  05/25/18 2048    Lab Status:  Final result Specimen:  Urine Updated:  05/25/18 2046     Color, UA Yellow     Clarity, UA Cloudy     pH, UA 5 5     Leukocytes, UA Negative     Nitrite, UA Negative     Protein, UA 30 (1+) (A) mg/dl      Glucose, UA Negative mg/dl      Ketones, UA Trace (A) mg/dl      Urobilinogen, UA 1 0 E U /dl      Bilirubin, UA Interference- unable to analyze (A)     Blood, UA Negative     Specific Gravity, UA >=1 030    Narrative:       CLINITEK RESULT No orders to display              Procedures  Procedures       Phone Contacts  ED Phone Contact    ED Course                               MDM  Number of Diagnoses or Management Options  Bariatric surgery status:   GERD (gastroesophageal reflux disease):   Hematemesis:   Diagnosis management comments: 69-year-old female who is well-known to the bariatric service presents emergency department for one time vomiting this day with brown coffee emesis  Patient states that this was blood  Labs reviewed  Patient with improvement prior to evaluation and during evaluation  I have spoken with Dr Jaime Gtz from the bariatric service and his recommendations are to have patient begin clear liquid diet over the next 2-3 days  Provide omeprazole all twice a day  Patient is to follow up mid to late next week  Did not feel that radiographic data would aid in diagnosis and/or management  Patient was discharged in improved condition  Patient educated on diagnosis and home management and recommendations by bariatric surgeon  Patient educated on persistent or worsening signs symptoms and to follow up with bariatric surgery and/or return to the emergency department  Patient male significant other and daughter admit to understanding and agreement         Amount and/or Complexity of Data Reviewed  Clinical lab tests: ordered and reviewed      CritCare Time    Disposition  Final diagnoses:   Hematemesis   Bariatric surgery status   GERD (gastroesophageal reflux disease)     Time reflects when diagnosis was documented in both MDM as applicable and the Disposition within this note     Time User Action Codes Description Comment    5/25/2018 10:54 PM Nawaf Giordano Add [K92 0] Hematemesis     5/25/2018 10:54 PM Nawaf Giordnao Add [Z98 84] Bariatric surgery status     5/25/2018 10:54 PM Bard Scar Omalley Add [K21 9] GERD (gastroesophageal reflux disease)       ED Disposition     ED Disposition Condition Comment    Discharge Jez Acosta discharge to home/self care  Condition at discharge: Stable        Follow-up Information     Follow up With Specialties Details Why Meredith Ford  57 , MD General Surgery, [address] [address] [address] [facility]  [phone]            Discharge Medication List as of 5/25/2018 10:59 PM      START taking these medications    Details   omeprazole (PriLOSEC) 20 mg delayed release capsule Take 1 capsule (20 mg total) by mouth 2 (two) times a day, Starting Fri 5/25/2018, Print         CONTINUE these medications which have NOT CHANGED    Details   Calcium 500-100 MG-UNIT CHEW Chew, Historical Med      DAILY MULTIPLE VITAMINS/IRON TABS Take by mouth, Historical Med      varenicline (CHANTIX) 0 5 mg tablet Start 1 tab daily x 3 days, then 1 tab twice daily x 4 days, then increase to 2 tabs twice daily x 3 months , Normal           No discharge procedures on file      ED Provider  Electronically Signed by           Suni Bacon PA-C  05/26/18 4502

## 2018-05-29 ENCOUNTER — TELEPHONE (OUTPATIENT)
Dept: BARIATRICS | Facility: CLINIC | Age: 33
End: 2018-05-29

## 2018-05-29 NOTE — TELEPHONE ENCOUNTER
Patient called to report that she was in ER on Friday vomiting blood  She was told to make a follow up visit with Dr Hiram Byrd but after speaking with her she mentioned that she is doing well  She was given Omeprazole twice daily which has helped her acid reflux  I advised her that if she starts having heartburn symptoms again to call us back to be seen

## 2018-06-11 RX ORDER — AZITHROMYCIN 250 MG/1
TABLET, FILM COATED ORAL
COMMUNITY
End: 2018-06-15

## 2018-06-11 RX ORDER — BENZONATATE 100 MG/1
CAPSULE ORAL
COMMUNITY
End: 2018-06-15

## 2018-06-11 RX ORDER — BUPROPION HYDROCHLORIDE 150 MG/1
TABLET, EXTENDED RELEASE ORAL
COMMUNITY
End: 2018-06-15

## 2018-06-11 RX ORDER — OYSTER SHELL CALCIUM WITH VITAMIN D 500; 200 MG/1; [IU]/1
TABLET, FILM COATED ORAL
COMMUNITY
End: 2018-06-28

## 2018-06-15 ENCOUNTER — APPOINTMENT (OUTPATIENT)
Dept: RADIOLOGY | Facility: CLINIC | Age: 33
End: 2018-06-15
Payer: COMMERCIAL

## 2018-06-15 ENCOUNTER — APPOINTMENT (OUTPATIENT)
Dept: LAB | Facility: CLINIC | Age: 33
End: 2018-06-15
Payer: COMMERCIAL

## 2018-06-15 ENCOUNTER — CLINICAL SUPPORT (OUTPATIENT)
Dept: URGENT CARE | Facility: CLINIC | Age: 33
End: 2018-06-15
Payer: COMMERCIAL

## 2018-06-15 ENCOUNTER — TRANSCRIBE ORDERS (OUTPATIENT)
Dept: LAB | Facility: CLINIC | Age: 33
End: 2018-06-15

## 2018-06-15 ENCOUNTER — OFFICE VISIT (OUTPATIENT)
Dept: FAMILY MEDICINE CLINIC | Facility: CLINIC | Age: 33
End: 2018-06-15
Payer: COMMERCIAL

## 2018-06-15 VITALS
DIASTOLIC BLOOD PRESSURE: 82 MMHG | OXYGEN SATURATION: 98 % | HEART RATE: 92 BPM | HEIGHT: 61 IN | TEMPERATURE: 96.3 F | RESPIRATION RATE: 16 BRPM | SYSTOLIC BLOOD PRESSURE: 124 MMHG | BODY MASS INDEX: 30.21 KG/M2 | WEIGHT: 160 LBS

## 2018-06-15 DIAGNOSIS — Z78.9: ICD-10-CM

## 2018-06-15 DIAGNOSIS — Z72.0 TOBACCO ABUSE: ICD-10-CM

## 2018-06-15 DIAGNOSIS — Z01.84 IMMUNITY STATUS TESTING: ICD-10-CM

## 2018-06-15 DIAGNOSIS — Z01.818 ENCOUNTER FOR OTHER PREPROCEDURAL EXAMINATION: Primary | ICD-10-CM

## 2018-06-15 DIAGNOSIS — I10 ESSENTIAL HYPERTENSION: ICD-10-CM

## 2018-06-15 DIAGNOSIS — Z01.818 ENCOUNTER FOR OTHER PREPROCEDURAL EXAMINATION: ICD-10-CM

## 2018-06-15 DIAGNOSIS — Z98.84 H/O GASTRIC BYPASS: ICD-10-CM

## 2018-06-15 DIAGNOSIS — Z01.818 PREOPERATIVE CLEARANCE: ICD-10-CM

## 2018-06-15 DIAGNOSIS — Z01.818 PREOPERATIVE CLEARANCE: Primary | ICD-10-CM

## 2018-06-15 DIAGNOSIS — Z01.84 IMMUNITY TO VARICELLA DETERMINED BY SEROLOGIC TEST: ICD-10-CM

## 2018-06-15 DIAGNOSIS — Z87.19 HISTORY OF HEMATEMESIS: ICD-10-CM

## 2018-06-15 DIAGNOSIS — R82.90 ABNORMAL URINALYSIS: ICD-10-CM

## 2018-06-15 DIAGNOSIS — Z01.84 IMMUNITY TO MEASLES, MUMPS, AND RUBELLA DETERMINED BY SEROLOGIC TEST: ICD-10-CM

## 2018-06-15 DIAGNOSIS — Z23 HEPATITIS B VACCINATION ADMINISTERED AT CURRENT VISIT: ICD-10-CM

## 2018-06-15 DIAGNOSIS — Z01.84 IMMUNITY TO HEPATITIS B VIRUS DEMONSTRATED BY SEROLOGIC TEST: ICD-10-CM

## 2018-06-15 PROBLEM — S92.353A CLOSED FRACTURE OF BASE OF FIFTH METATARSAL BONE: Status: ACTIVE | Noted: 2018-06-15

## 2018-06-15 PROBLEM — S52.133A CLOSED FRACTURE OF NECK OF RADIUS: Status: ACTIVE | Noted: 2018-06-15

## 2018-06-15 LAB
ANION GAP SERPL CALCULATED.3IONS-SCNC: 7 MMOL/L (ref 4–13)
BACTERIA UR QL AUTO: ABNORMAL /HPF
BASOPHILS # BLD AUTO: 0.03 THOUSANDS/ΜL (ref 0–0.1)
BASOPHILS NFR BLD AUTO: 0 % (ref 0–1)
BILIRUB UR QL STRIP: NEGATIVE
BUN SERPL-MCNC: 5 MG/DL (ref 5–25)
CALCIUM SERPL-MCNC: 9.6 MG/DL (ref 8.3–10.1)
CHLORIDE SERPL-SCNC: 102 MMOL/L (ref 100–108)
CLARITY UR: ABNORMAL
CO2 SERPL-SCNC: 30 MMOL/L (ref 21–32)
COLOR UR: YELLOW
CREAT SERPL-MCNC: 0.64 MG/DL (ref 0.6–1.3)
EOSINOPHIL # BLD AUTO: 0.13 THOUSAND/ΜL (ref 0–0.61)
EOSINOPHIL NFR BLD AUTO: 1 % (ref 0–6)
ERYTHROCYTE [DISTWIDTH] IN BLOOD BY AUTOMATED COUNT: 12.4 % (ref 11.6–15.1)
FERRITIN SERPL-MCNC: 88 NG/ML (ref 8–388)
GFR SERPL CREATININE-BSD FRML MDRD: 119 ML/MIN/1.73SQ M
GLUCOSE P FAST SERPL-MCNC: 67 MG/DL (ref 65–99)
GLUCOSE UR STRIP-MCNC: NEGATIVE MG/DL
HCT VFR BLD AUTO: 43.4 % (ref 34.8–46.1)
HGB BLD-MCNC: 14.5 G/DL (ref 11.5–15.4)
HGB UR QL STRIP.AUTO: NEGATIVE
IMM GRANULOCYTES # BLD AUTO: 0.03 THOUSAND/UL (ref 0–0.2)
IMM GRANULOCYTES NFR BLD AUTO: 0 % (ref 0–2)
IRON SATN MFR SERPL: 22 %
IRON SERPL-MCNC: 74 UG/DL (ref 50–170)
KETONES UR STRIP-MCNC: NEGATIVE MG/DL
LEUKOCYTE ESTERASE UR QL STRIP: NEGATIVE
LYMPHOCYTES # BLD AUTO: 2.17 THOUSANDS/ΜL (ref 0.6–4.47)
LYMPHOCYTES NFR BLD AUTO: 19 % (ref 14–44)
MCH RBC QN AUTO: 31.5 PG (ref 26.8–34.3)
MCHC RBC AUTO-ENTMCNC: 33.4 G/DL (ref 31.4–37.4)
MCV RBC AUTO: 94 FL (ref 82–98)
MONOCYTES # BLD AUTO: 0.48 THOUSAND/ΜL (ref 0.17–1.22)
MONOCYTES NFR BLD AUTO: 4 % (ref 4–12)
NEUTROPHILS # BLD AUTO: 8.51 THOUSANDS/ΜL (ref 1.85–7.62)
NEUTS SEG NFR BLD AUTO: 76 % (ref 43–75)
NITRITE UR QL STRIP: NEGATIVE
NON-SQ EPI CELLS URNS QL MICRO: ABNORMAL /HPF
NRBC BLD AUTO-RTO: 0 /100 WBCS
PH UR STRIP.AUTO: 7 [PH] (ref 4.5–8)
PLATELET # BLD AUTO: 260 THOUSANDS/UL (ref 149–390)
PMV BLD AUTO: 10.9 FL (ref 8.9–12.7)
POTASSIUM SERPL-SCNC: 3.6 MMOL/L (ref 3.5–5.3)
PROT UR STRIP-MCNC: NEGATIVE MG/DL
RBC # BLD AUTO: 4.6 MILLION/UL (ref 3.81–5.12)
RBC #/AREA URNS AUTO: ABNORMAL /HPF
RUBV IGG SERPL IA-ACNC: 62.8 IU/ML
SODIUM SERPL-SCNC: 139 MMOL/L (ref 136–145)
SP GR UR STRIP.AUTO: 1.01 (ref 1–1.03)
TIBC SERPL-MCNC: 332 UG/DL (ref 250–450)
UROBILINOGEN UR QL STRIP.AUTO: 0.2 E.U./DL
WBC # BLD AUTO: 11.35 THOUSAND/UL (ref 4.31–10.16)
WBC #/AREA URNS AUTO: ABNORMAL /HPF

## 2018-06-15 PROCEDURE — 90746 HEPB VACCINE 3 DOSE ADULT IM: CPT

## 2018-06-15 PROCEDURE — 90471 IMMUNIZATION ADMIN: CPT

## 2018-06-15 PROCEDURE — 86735 MUMPS ANTIBODY: CPT

## 2018-06-15 PROCEDURE — 99243 OFF/OP CNSLTJ NEW/EST LOW 30: CPT | Performed by: PHYSICIAN ASSISTANT

## 2018-06-15 PROCEDURE — 82728 ASSAY OF FERRITIN: CPT

## 2018-06-15 PROCEDURE — 86762 RUBELLA ANTIBODY: CPT

## 2018-06-15 PROCEDURE — 71046 X-RAY EXAM CHEST 2 VIEWS: CPT

## 2018-06-15 PROCEDURE — 86765 RUBEOLA ANTIBODY: CPT

## 2018-06-15 PROCEDURE — 83550 IRON BINDING TEST: CPT

## 2018-06-15 PROCEDURE — 86787 VARICELLA-ZOSTER ANTIBODY: CPT

## 2018-06-15 PROCEDURE — 80048 BASIC METABOLIC PNL TOTAL CA: CPT

## 2018-06-15 PROCEDURE — 81001 URINALYSIS AUTO W/SCOPE: CPT

## 2018-06-15 PROCEDURE — 83540 ASSAY OF IRON: CPT

## 2018-06-15 PROCEDURE — 85025 COMPLETE CBC W/AUTO DIFF WBC: CPT

## 2018-06-15 PROCEDURE — 36415 COLL VENOUS BLD VENIPUNCTURE: CPT

## 2018-06-15 NOTE — PROGRESS NOTES
PRE-OPERATIVE EVALUATION  [facility]ILY PRACTICE AT [id number]    NAME: Jez Acosta  AGE: 33 y o  SEX: female  : 1985     DATE: 6/15/2018    Internal Medicine Pre-Operative Evaluation      Chief Complaint: Pre-operative Evaluation     Surgery: L ankle arthroscopy and possible reconstruction   Anticipated Date of Surgery: 18  Referring Provider: Dr Kajal Wilhelm      History of Present Illness:     Freddie Beaulieu is a 33 y o  female who presents to the office today for a preoperative consultation at the request of surgeon, Dr Kajal Wilhelm, who plans on performing L ankle arthroscopy and possible reconstruction on 18  Planned anesthesia is general  Patient has a bleeding risk of: recent abnormal bleeding (1 episode of hemoptysis, cbc and iron normal; spoke with general surgery s/p gastric bypass and started on PPI and will follow up in Aug )  Current anti-platelet/anti-coagulation medications that the patient is prescribed includes: none  Follow up with bariatric in Aug, prilosec twice daily  No further episodes  Has had no further heartburn        Assessment of Chronic Conditions:   · 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, iron normal, has regular follow up with bariatric surgery   · Tobacco abuse - obtain routine screening chest xray, encourage cessation, she wishes to restart chantix   · GERD - PPI        Assessment of Cardiac Risk:  · Denies unstable or severe angina or MI in the last 6 weeks or history of stent placement in the last year   · Denies decompensated heart failure (e g  New onset heart failure, NYHA functional class IV heart failure, or worsening existing heart failure)  · Denies significant arrhythmias such as high grade AV block, symptomatic ventricular arrhythmia, newly recognized ventricular tachycardia, supraventricular tachycardia with resting heart rate >100, or symptomatic bradycardia  · Denies severe heart valve disease including aortic stenosis or symptomatic mitral stenosis     Exercise Capacity:  · Able to walk 4 blocks without symptoms?: Yes  · Able to walk 2 flights without symptoms?: Yes    Prior Anesthesia Reactions: No     Personal history of venous thromboembolic disease? No    History of steroid use for >2 weeks within last year? No    Review of Systems:     Review of Systems   Constitutional: Negative for chills, fatigue, fever and unexpected weight change  HENT: Negative for congestion, ear pain, hearing loss, nosebleeds, sore throat and trouble swallowing  Eyes: Negative for pain and visual disturbance  Respiratory: Negative for cough, chest tightness, shortness of breath and wheezing  Cardiovascular: Negative for chest pain, palpitations and leg swelling  Gastrointestinal: Negative for abdominal pain, blood in stool, constipation, diarrhea, nausea and vomiting  One episode of hemopytsis - on PPI   Genitourinary: Negative for difficulty urinating, dysuria and hematuria  Musculoskeletal: Positive for arthralgias (L ankle)  Negative for myalgias and neck pain  Skin: Negative for color change, rash and wound  Neurological: Negative for dizziness, syncope, weakness, light-headedness and headaches  Hematological: Negative for adenopathy  Does not bruise/bleed easily  Psychiatric/Behavioral: Negative for confusion and sleep disturbance  The patient is not nervous/anxious          Current Problem List:     Patient Active Problem List   Diagnosis    Vitamin D deficiency    Seasonal allergies    GERD (gastroesophageal reflux disease)    Postgastrectomy malabsorption    Low serum vitamin B12    Bariatric surgery status    Gastric band slippage    Vitamin B12 deficiency    Closed fracture of base of fifth metatarsal bone    Closed fracture of neck of radius       Allergies:     No Known Allergies    Physical Exam:       Current Outpatient Prescriptions:     Calcium 500-100 MG-UNIT CHEW, Chew, Disp: , Rfl:     calcium-vitamin D (OSCAL 500 + D) 500 mg-200 units per tablet, Take by mouth, Disp: , Rfl:     DAILY MULTIPLE VITAMINS/IRON TABS, Take by mouth, Disp: , Rfl:     metoprolol tartrate (LOPRESSOR) 25 mg tablet, metoprolol tartrate 25 mg tablet, Disp: , Rfl:     omeprazole (PriLOSEC) 20 mg delayed release capsule, Take 1 capsule (20 mg total) by mouth 2 (two) times a day, Disp: 30 capsule, Rfl: 0    varenicline (CHANTIX) 0 5 mg tablet, Start 1 tab daily x 3 days, then 1 tab twice daily x 4 days, then increase to 2 tabs twice daily x 3 months , Disp: 30 tablet, Rfl: 0    Past Medical History:       Past Medical History:   Diagnosis Date    Dysfunctional uterine bleeding     last assessed: 13    Elevated blood pressure reading     last assessed: 16    Exercises 5 to 6 times per week     Fall     GERD (gastroesophageal reflux disease)     Headache     History of bone cyst     cyst of joint of ankle or foot, last assessed: 05/05/15    History of hypothyroidism     "told no longer has it"    Irritable bowel syndrome     Migraines     Obesity     Palpitations     last assessed: 16    Seasonal allergies     Skin lesion     Tachycardia     last assessed: 17    Vitamin B12 deficiency     Vitamin D deficiency     Wears glasses         Past Surgical History:   Procedure Laterality Date     SECTION      CHOLECYSTECTOMY      ESOPHAGOGASTRODUODENOSCOPY      GALLBLADDER SURGERY      HYSTERECTOMY      LAPAROSCOPIC GASTRIC BANDING      [place] EGD TRANSORAL BIOPSY SINGLE/MULTIPLE N/A 5/3/2017    Procedure: ESOPHAGOGASTRODUODENOSCOPY (EGD) with bx;  Surgeon: Isabella Brian MD;  Location: [facility];   Service: Bariatrics    NH LAP GASTRIC BYPASS/BENJAMIN-EN-Y N/A 2017    Procedure: BYPASS GASTRIC  BENJAMIN-EN-Y LAPAROSCOPIC, intraoperative EGD;  Surgeon: Isabella Brian MD;  [facility];  Service: Bariatrics    REMOVAL GASTRIC BAND LAPAROSCOPIC  2015    SKIN BIOPSY          Family History Problem Relation Age of Onset    Atrial fibrillation Mother     Gallbladder disease Mother     No Known Problems Father     Hypertension Brother     Diabetes Family     Hypertension Family         Social History     Social History    Marital status: /Civil Union     Spouse name: N/A    Number of children: N/A    Years of education: N/A     Occupational History          Social History Main Topics    Smoking status: Former Smoker     Quit date: 3/24/2017    Smokeless tobacco: Never Used    Alcohol use No    Drug use: No    Sexual activity: Yes     Other Topics Concern    Not on file     Social History Narrative    Caffeine use    Living independently with spouse, and children    Two children    Uses safety equipment - seatbelts            Physical Exam:     Physical Exam   Constitutional: She is oriented to person, place, and time  Vital signs are normal  She appears well-developed and well-nourished  No distress  HENT:   Head: Normocephalic and atraumatic  Right Ear: Tympanic membrane, external ear and ear canal normal    Left Ear: Tympanic membrane, external ear and ear canal normal    Nose: Nose normal    Mouth/Throat: Oropharynx is clear and moist    Eyes: Conjunctivae and lids are normal  Pupils are equal, round, and reactive to light  Neck: Trachea normal and normal range of motion  Neck supple  No thyromegaly present  Cardiovascular: Normal rate, regular rhythm, S1 normal, S2 normal and intact distal pulses  Exam reveals no gallop  No murmur heard  Pulmonary/Chest: Breath sounds normal  No respiratory distress  She has no wheezes  She has no rhonchi  She has no rales  Abdominal: Soft  Normal appearance and bowel sounds are normal  She exhibits no mass  There is no hepatosplenomegaly  There is no tenderness  Musculoskeletal: She exhibits no edema or deformity     L ankle in brace with decreased ROM on flexion and extension   Lymphadenopathy:     She has no cervical adenopathy  Neurological: She is alert and oriented to person, place, and time  She has normal reflexes  No cranial nerve deficit or sensory deficit  Skin: Skin is warm and dry  No rash noted  No cyanosis  No pallor  Nails show no clubbing  Psychiatric: She has a normal mood and affect  Her behavior is normal  Cognition and memory are normal         Data:     Pre-operative work-up    Laboratory Results: I have personally reviewed the pertinent laboratory results/reports  and UA normal, bmp normal, wbx mildly elevated at 11s without s/sx of infection, will continue to monitor on OP labs     EKG: I have personally reviewed pertinent reports  and NSR on 6/15    Chest x-ray: I have personally reviewed pertinent reports  Chest xray clear on 6/15      Assessment & Recommendations:     1  Preoperative clearance  XR chest pa & lateral    ECG 12 lead   2  Abnormal urinalysis  Urinalysis with microscopic   3  Immunity to measles, mumps, and rubella determined by serologic test  Measles/Mumps/Rubella Immunity   4  Immunity to varicella determined by serologic test  Varicella zoster antibody, IgG   5  Immunity to hepatitis B virus demonstrated by serologic test  CANCELED: Hepatitis B Immunity Panel   6  Hepatitis B vaccination administered at current visit  HEPATITIS B VACCINE ADULT IM   7  Tobacco abuse  XR chest pa & lateral    ECG 12 lead   8  Essential hypertension  ECG 12 lead   9  Beta blocker administered perioperatively  ECG 12 lead   10  H/O gastric bypass  Iron Saturation %    Ferritin   11  History of hematemesis  Iron Saturation %    Ferritin       Pre-Op Evaluation Assessment  Known risk factors for perioperative complications: None  Cardiac Risk Estimation: per the Revised Cardiac Risk Index (Circ  100:1043, 1999), the patient's risk factors for cardiac complications include none, putting her in: RCI RISK CLASS I (0 risk factors, risk of major cardiac compl  appr  0 5%)      Current medications which may produce withdrawal symptoms if withheld perioperatively: none  Pre-Op Evaluation Plan  1  Further preoperative workup as follows:   - None; no further preoperative work-up is required    2  Medication Management/Recommendations:   - None, continue medication regimen including morning of surgery, with sip of water    3  Prophylaxis for cardiac events with perioperative beta-blockers: not indicated  4  Patient requires further consultation with: None    Clearance  Patient is CLEARED for surgery without any additional cardiac testing       Catalina Hackett [address] [address]  Phone#  [phone]  Fax#  [phone]

## 2018-06-18 LAB
ATRIAL RATE: 68 BPM
P AXIS: 41 DEGREES
PR INTERVAL: 156 MS
QRS AXIS: 80 DEGREES
QRSD INTERVAL: 74 MS
QT INTERVAL: 408 MS
QTC INTERVAL: 433 MS
T WAVE AXIS: 48 DEGREES
VENTRICULAR RATE: 68 BPM

## 2018-06-19 LAB
MEV IGG SER QL: NORMAL
MUV IGG SER QL: NORMAL
VZV IGG SER IA-ACNC: NORMAL

## 2018-06-28 NOTE — PRE-PROCEDURE INSTRUCTIONS
Pre-Surgery Instructions:   Medication Instructions    Calcium 500-100 MG-UNIT CHEW Instructed patient per Anesthesia Guidelines   DAILY MULTIPLE VITAMINS/IRON TABS Instructed patient per Anesthesia Guidelines   omeprazole (PriLOSEC) 20 mg delayed release capsule Instructed patient per Anesthesia Guidelines  Pt instructed via TC and anesthesia guidelines *to take omeprazole with a sip of water the morning of surgery  Pt given/reviewed [facility] preop instructions and she has chlorhexadine soap   Pt to hold asa/NSAIDS/vitamins/herbal supplements one week before surgery or as per Dr Sandrita Monique

## 2018-07-05 ENCOUNTER — ANESTHESIA EVENT (OUTPATIENT)
Dept: PERIOP | Facility: HOSPITAL | Age: 33
End: 2018-07-05
Payer: COMMERCIAL

## 2018-07-06 ENCOUNTER — ANESTHESIA (OUTPATIENT)
Dept: PERIOP | Facility: HOSPITAL | Age: 33
End: 2018-07-06
Payer: COMMERCIAL

## 2018-07-06 ENCOUNTER — HOSPITAL ENCOUNTER (OUTPATIENT)
Dept: RADIOLOGY | Facility: HOSPITAL | Age: 33
Setting detail: OUTPATIENT SURGERY
Discharge: HOME | End: 2018-07-06
Payer: COMMERCIAL

## 2018-07-06 ENCOUNTER — HOSPITAL ENCOUNTER (OUTPATIENT)
Facility: HOSPITAL | Age: 33
Setting detail: OUTPATIENT SURGERY
Discharge: HOME | End: 2018-07-06
Attending: PODIATRIST | Admitting: PODIATRIST
Payer: COMMERCIAL

## 2018-07-06 VITALS
HEIGHT: 62 IN | HEART RATE: 68 BPM | BODY MASS INDEX: 27.23 KG/M2 | OXYGEN SATURATION: 98 % | SYSTOLIC BLOOD PRESSURE: 115 MMHG | TEMPERATURE: 98.4 F | DIASTOLIC BLOOD PRESSURE: 66 MMHG | WEIGHT: 148 LBS | RESPIRATION RATE: 16 BRPM

## 2018-07-06 DIAGNOSIS — M25.572 LEFT ANKLE PAIN: ICD-10-CM

## 2018-07-06 DIAGNOSIS — Z98.890 S/P FOOT SURGERY, LEFT: Primary | ICD-10-CM

## 2018-07-06 LAB — EXT PREGNANCY TEST URINE: NEGATIVE

## 2018-07-06 PROCEDURE — 73600 X-RAY EXAM OF ANKLE: CPT

## 2018-07-06 PROCEDURE — 81025 URINE PREGNANCY TEST: CPT | Performed by: ANESTHESIOLOGY

## 2018-07-06 RX ORDER — MIDAZOLAM HYDROCHLORIDE 1 MG/ML
INJECTION INTRAMUSCULAR; INTRAVENOUS AS NEEDED
Status: DISCONTINUED | OUTPATIENT
Start: 2018-07-06 | End: 2018-07-06 | Stop reason: SURG

## 2018-07-06 RX ORDER — ONDANSETRON 2 MG/ML
INJECTION INTRAMUSCULAR; INTRAVENOUS AS NEEDED
Status: DISCONTINUED | OUTPATIENT
Start: 2018-07-06 | End: 2018-07-06 | Stop reason: SURG

## 2018-07-06 RX ORDER — DEXAMETHASONE SODIUM PHOSPHATE 4 MG/ML
INJECTION, SOLUTION INTRA-ARTICULAR; INTRALESIONAL; INTRAMUSCULAR; INTRAVENOUS; SOFT TISSUE AS NEEDED
Status: DISCONTINUED | OUTPATIENT
Start: 2018-07-06 | End: 2018-07-06 | Stop reason: SURG

## 2018-07-06 RX ORDER — DEXAMETHASONE SODIUM PHOSPHATE 4 MG/ML
INJECTION, SOLUTION INTRA-ARTICULAR; INTRALESIONAL; INTRAMUSCULAR; INTRAVENOUS; SOFT TISSUE AS NEEDED
Status: DISCONTINUED | OUTPATIENT
Start: 2018-07-06 | End: 2018-07-06 | Stop reason: HOSPADM

## 2018-07-06 RX ORDER — MAGNESIUM HYDROXIDE 1200 MG/15ML
LIQUID ORAL AS NEEDED
Status: DISCONTINUED | OUTPATIENT
Start: 2018-07-06 | End: 2018-07-06 | Stop reason: HOSPADM

## 2018-07-06 RX ORDER — HYDROCODONE BITARTRATE AND ACETAMINOPHEN 5; 325 MG/1; MG/1
1 TABLET ORAL EVERY 6 HOURS PRN
Qty: 24 TABLET | Refills: 0 | Status: SHIPPED | OUTPATIENT
Start: 2018-07-06 | End: 2018-07-16

## 2018-07-06 RX ORDER — FENTANYL CITRATE/PF 50 MCG/ML
25 SYRINGE (ML) INJECTION
Status: DISCONTINUED | OUTPATIENT
Start: 2018-07-06 | End: 2018-07-06 | Stop reason: HOSPADM

## 2018-07-06 RX ORDER — FENTANYL CITRATE 50 UG/ML
INJECTION, SOLUTION INTRAMUSCULAR; INTRAVENOUS AS NEEDED
Status: DISCONTINUED | OUTPATIENT
Start: 2018-07-06 | End: 2018-07-06 | Stop reason: SURG

## 2018-07-06 RX ORDER — SODIUM CHLORIDE 9 MG/ML
125 INJECTION, SOLUTION INTRAVENOUS CONTINUOUS
Status: DISCONTINUED | OUTPATIENT
Start: 2018-07-06 | End: 2018-07-06 | Stop reason: HOSPADM

## 2018-07-06 RX ORDER — ONDANSETRON 2 MG/ML
4 INJECTION INTRAMUSCULAR; INTRAVENOUS ONCE AS NEEDED
Status: DISCONTINUED | OUTPATIENT
Start: 2018-07-06 | End: 2018-07-06 | Stop reason: HOSPADM

## 2018-07-06 RX ORDER — OXYCODONE HYDROCHLORIDE AND ACETAMINOPHEN 5; 325 MG/1; MG/1
1 TABLET ORAL EVERY 4 HOURS PRN
Status: DISCONTINUED | OUTPATIENT
Start: 2018-07-06 | End: 2018-07-06 | Stop reason: HOSPADM

## 2018-07-06 RX ORDER — ROPIVACAINE HYDROCHLORIDE 5 MG/ML
INJECTION, SOLUTION EPIDURAL; INFILTRATION; PERINEURAL AS NEEDED
Status: DISCONTINUED | OUTPATIENT
Start: 2018-07-06 | End: 2018-07-06 | Stop reason: SURG

## 2018-07-06 RX ORDER — PROPOFOL 10 MG/ML
INJECTION, EMULSION INTRAVENOUS AS NEEDED
Status: DISCONTINUED | OUTPATIENT
Start: 2018-07-06 | End: 2018-07-06 | Stop reason: SURG

## 2018-07-06 RX ORDER — SCOLOPAMINE TRANSDERMAL SYSTEM 1 MG/1
1 PATCH, EXTENDED RELEASE TRANSDERMAL ONCE AS NEEDED
Status: DISCONTINUED | OUTPATIENT
Start: 2018-07-06 | End: 2018-07-06 | Stop reason: HOSPADM

## 2018-07-06 RX ADMIN — ROPIVACAINE HYDROCHLORIDE 15 ML: 5 INJECTION, SOLUTION EPIDURAL; INFILTRATION; PERINEURAL at 15:13

## 2018-07-06 RX ADMIN — SCOPALAMINE 1 PATCH: 1 PATCH, EXTENDED RELEASE TRANSDERMAL at 14:28

## 2018-07-06 RX ADMIN — LIDOCAINE HYDROCHLORIDE 100 MG: 20 INJECTION, SOLUTION INTRAVENOUS at 15:23

## 2018-07-06 RX ADMIN — SODIUM CHLORIDE 125 ML/HR: 0.9 INJECTION, SOLUTION INTRAVENOUS at 13:06

## 2018-07-06 RX ADMIN — PROPOFOL 200 MG: 10 INJECTION, EMULSION INTRAVENOUS at 15:23

## 2018-07-06 RX ADMIN — ROPIVACAINE HYDROCHLORIDE 25 ML: 5 INJECTION, SOLUTION EPIDURAL; INFILTRATION; PERINEURAL at 15:10

## 2018-07-06 RX ADMIN — FENTANYL CITRATE 100 MCG: 50 INJECTION, SOLUTION INTRAMUSCULAR; INTRAVENOUS at 14:59

## 2018-07-06 RX ADMIN — MIDAZOLAM 4 MG: 1 INJECTION INTRAMUSCULAR; INTRAVENOUS at 14:59

## 2018-07-06 RX ADMIN — ONDANSETRON HYDROCHLORIDE 4 MG: 2 INJECTION, SOLUTION INTRAVENOUS at 15:28

## 2018-07-06 RX ADMIN — CEFAZOLIN SODIUM 1000 MG: 1 SOLUTION INTRAVENOUS at 15:28

## 2018-07-06 RX ADMIN — DEXAMETHASONE SODIUM PHOSPHATE 4 MG: 4 INJECTION, SOLUTION INTRAMUSCULAR; INTRAVENOUS at 15:28

## 2018-07-06 NOTE — OP NOTE
OPERATIVE REPORT  PATIENT NAME: Shahab Mario    :  1985  MRN: [id number]  Pt Location: [facility] OR ROOM 05    SURGERY DATE: 2018    Surgeon(s) and Role:     * Lilia Klein DPM - Primary     * Toya Mcgrath DPM - Assisting     * Yvette Duke DPM - Assisting    Preop Diagnosis:  Pain in left ankle [M25 572]  Gastrocnemius equinus    Post-Op Diagnosis Codes:     * Pain in left ankle [M25 572]  Gastrocnemius equinus    Procedure(s) (LRB):  ANKLE ARTHROSCOPIC EXPLORATION; REPEAT STRESSED IMAGING (Left)  RECESSION GASTROC OPEN (Left)    Specimen(s):  * No specimens in log *    Estimated Blood Loss:   Minimal    Drains:  None     Anesthesia Type:   General w/ Popliteal Block    Hemostasis:   Anatomical dissection  66 minutes on a left pneumatic thigh tourniquet at 300 mm of mercury    Materials:   3-0 vicryl   4-0 monocryl   4-0 nylon     Injectables:  1% lidocaine with epinephrine and decadron    Operative Indications:  Pain in left ankle [M25 572]  Gastrocnemius equinus    Operative Findings:  No arthritic lesion noted  Mild synovitis throughout the ankle joint  Stress imaging unremarkable noted to have stable ankle  Gastrocnemius equinus    Complications:   None    Procedure and Technique:  Under mild sedation, the patient was brought into the operating room and placed on the operating room table in the supine position  A pneumatic thigh tourniquet was then placed around the patient's left thigh with ample webril padding  Following general anesthesia, a time out was performed to confirm the correct patient, procedure and site with all parties in agreement  The foot was then scrubbed, prepped and draped in the usual aseptic manner  An esmarch bandage was utilized to exsangunate the patients foot and leg and the pneumatic thigh tourniquet was then inflated  The esmarch bandage was removed and the foot was placed on the operating room table      Stress imaging was performed with fluoroscopy and was noted to be unremarkable - the left ankle was stable negating need for stabilization procedure  Attention was first directed left calf to perform a gastrocnemius recession for the treatment of equinus  A 3 5 cm linear incision was effected along the medial aspect of left calf along the myotendinous junction  Dissection was carried down to the deep fascia small vessels were cauterized as needed  Utilizing blunt dissection a finger was placed along the deep fascia both medially and laterally  With adequate retraction a linear incision was made through the deep fascia exposing the underlying gastrocnemius aponeurosis  With  blunt dissection the surgical plane underlying the deep fascia was undermined both medially and laterally  A malleable retractor was then introduced to protect posterior soft tissue envelope, and the aponeurosis was incised from medial to lateral  The foot was dorsiflexed and adequate reduction of the contracture was appreciated  Following irrigation the deep fascia was reapproximated with 3-0 Vicryl  Subcutaneous tissue was reapproximated using 3 0 Vicryl and a running type fashion  Skin closure was obtained with 4-0 Monocryl in a running subcuticular type fashion  Mastisol and Steri-Strips were applied  Attention was then directed to the anteromedial aspect of the Left ankle where the tendon of the tibialis anterior was identified at the level of the ankle joint  An 18 gauge needle was inserted into the medial portal puncturing the capsule to enter the ankle joint  30mL of normal saline was then expressed into the ankle joint, noting extension on the lateral aspect of the ankle  Medial portal was then marked and the ankle was then placed in distraction  A small 2 5mm stab incision was then made for the medial portal  This was deepened with a hemostat to the level of the capsule  A blunt obturator and cannula was then used to penetrate the capsule       The obturator was then removed and the camera was inserted into the cannula  All aspects of the talar dome was inspected along with the medial and lateral gutters, as well as the tibial plafond  There was mild synovitis throughout the joint  A lateral portal was then identified, a similarstab incision was made and deepened with a blunt hemostat to the level of the ankle joint capsule where the blunt obturator was then utilized to penetrate the capsule  The shaver was then inserted into the lateral portal  Appreciated synovitis was removed at this time  There was no articular defect noted in the ankle joint  The instruments were then removed  The skin was reapproximated utilizing 4-0 nylon  A postoperative injection consisting of 3 ml of 1% lidocaine with epinephrine and decadron was performed  The incision site was then dressed with xeroform, dry sterile dressing, and posterior splint  The pneumatic thigh tourniquet was deflated at 66 minutes and normal hyperemic flush was noted to all digits  The patient tolerated the procedure and anesthesia well and was transported to the PACU with vital signs stable       I was present for the entire procedure    Patient Disposition:  PACU  and hemodynamically stable    SIGNATURE: Aldo Mayorga DPM  DATE: July 6, 2018  TIME: 5:07 PM

## 2018-07-06 NOTE — ANESTHESIA PREPROCEDURE EVALUATION
Review of Systems/Medical History  Patient summary reviewed  Chart reviewed  No history of anesthetic complications     Cardiovascular  Negative cardio ROS Exercise tolerance (METS): good,     Pulmonary  Smoker (0 5 PPD) cigarette smoker  Cumulative Pack Years: 15,        GI/Hepatic    GERD well controlled, Bariatric surgery (LRYGB-7/2017),   Comment: IBS     Negative  ROS        Endo/Other  Negative endo/other ROS      GYN       Hematology  Negative hematology ROS      Musculoskeletal  Negative musculoskeletal ROS        Neurology    Headaches,    Psychology   Anxiety,              Physical Exam    Airway    Mallampati score: II  TM Distance: >3 FB  Neck ROM: full     Dental   No notable dental hx     Cardiovascular  Comment: Negative ROS, Rhythm: regular, Rate: normal, Cardiovascular exam normal    Pulmonary  Pulmonary exam normal Breath sounds clear to auscultation,     Other Findings        Anesthesia Plan  ASA Score- 2     Anesthesia Type- general with ASA Monitors  Additional Monitors:   Airway Plan:     Comment: Pop block preop  SCOP patch ordered for SDS  Plan Factors- Patient instructed to abstain from smoking on day of procedure  Patient smoked on day of surgery  Induction- intravenous  Postoperative Plan- Plan for postoperative opioid use  Informed Consent- Anesthetic plan and risks discussed with patient and spouse

## 2018-07-06 NOTE — DISCHARGE SUMMARY
Discharge Summary Outpatient Procedure Podiatry -   Paola Manjarrez Rep 33 y o  female MRN: [id number]  Unit/Bed#: OR [place] Encounter: [id number]    Admission Date: 7/6/2018     Admitting Diagnosis: Pain in left ankle [M25 572]    Discharge Diagnosis: same    Procedures Performed: ANKLE ARTHROSCOPIC EXPLORATION; REPEAT STRESSED IMAGING:   RECESSION GASTROC OPEN:     Complications: none    Condition at Discharge: stable    Discharge instructions/Information to patient and family:   See after visit summary for information provided to patient and family  Provisions for Follow-Up Care/Important appointments:  See after visit summary for information related to follow-up care and any pertinent home health orders  Discharge Medications:  See after visit summary for reconciled discharge medications provided to patient and family

## 2018-07-06 NOTE — DISCHARGE INSTRUCTIONS
[facility]  Dr Gautam Oliver  1)  Cold Pack: Apply a cold pack for 45 - Minutes intervals just above the surgical site for the initial 24-48 hours  Never place on the toes  If a cast has been applied  Apply the cold pack to the thigh or knee area  2)  Elevation and Rest: Keep the foot elevated at least as high as the hips for the first 24-48 hours  It would be beneficial for the foot to be elevated when you are sitting during the first 7-10 days  Elevating the foot above the heart level will help control post operative pain and swelling  3)  Medication: Take prescription as prescribed by your physician  If you have any difficulty or side effects with the medication, stop taking immediately and notify your physician at once  Medications given today:     Norco 1/947    3a  if applicable you may be given one of the following for prevention of blood clots  O Xarelto    Protection of Surgical Site/ Assistive Devices:  a) You will be given one of the following:  O posterior splint, nonweightbearing and crutches / knee scooter  4) DO NOT GET THE BADAGE WET UNTIL THE DOCTOR GIVES PERMISSION  Keep the bandage clean, dry and intact until your follow-up appointment  5) If you had a peripheral nerve block performed by anesthesia department the numbness and loss of motor function of the extremity can last anywhere from 12-24 hours and sometimes a little bit longer    General Information  1)  Bleeding: some bleeding through the bandage is normal  If bleeding persists, you may attempt to reinforce the existing bandage while following the above instruction  If bleeding persists, notify the physician  2)  Temperature: if your temperature rises [id number] degree, call the office [phone]  3)  Problem: If you notice increasing swelling and/ or pain 2 to 3 days following surgery , please notify    If a splint has been applied and you experience pain to extent the medication prescribed for pain does not seem to be effective you should remove the ace wraps and remove the splint if applied and allow about one half hour for relief then reapply splint and ace wraps  Should pain persist then please notify your physician  4)  Redressing: call the office the day following surgery to schedule a redressing to be in 7 days  Pain: Within the first 24 hours following surgery, if your pain is not controlled sufficiently with pain medication, please check that your bandage is not too tight  You may loosen the badge without removing it  Wait 30 minutes, if your pain is not relieved  Please call the office [phone]         Deep Vein Thrombosis Prevention   WHAT YOU NEED TO KNOW:   Deep vein thrombosis (DVT) is a blood clot that forms in a deep vein of the body  The deep veins in the legs, thighs, and hips are the most common sites for DVT  DVT can also occur in your arms  The clot prevents the normal flow of blood in the vein  The blood backs up and causes pain and swelling  The DVT can break into smaller pieces and travel to your lungs and cause a blockage called a pulmonary embolism  A pulmonary embolism can become life-threatening  DISCHARGE INSTRUCTIONS:   Call 911 for any of the following:   · You feel lightheaded, short of breath, and have chest pain  · You cough up blood  Seek care immediately if:   · Your arm or leg feels warm, tender, and painful  It may look swollen and red  Contact your healthcare provider if:   · You have questions or concerns about your condition or care  Risk factors for DVT:  A DVT can happen to anybody, but certain things can increase your risk  You may be at higher risk if you have had DVT in the past  You may also be at risk if you have a family member who has had blood clots   The following conditions also increase your risk:  · Limited activity caused by bed rest, a leg cast, or sitting for long periods    · Injury to a deep vein, or surgery    · A blood disorder that makes your blood clot faster than normal, such as factor V Leiden mutation    · Age older than 60 years    · Use of hormone replacement therapy or some types of birth control medicine    · Pregnancy, and for 6 weeks after childbirth     · Cancer or heart failure     · A catheter placed in a large vein    · Smoking    · Obesity or varicose veins  Prevent DVT:   · Guidelines for everyone:      ¨ Maintain a healthy weight  Ask your healthcare provider how much you should weigh  Ask him to help you create a weight loss plan if you are overweight  ¨ Do not smoke  Nicotine and other chemicals in cigarettes and cigars can damage blood vessels and increase your risk for a DVT  Ask your healthcare provider for information if you currently smoke and need help to quit  E-cigarettes or smokeless tobacco still contain nicotine  Talk to your healthcare provider before you use these products  ¨ Move regularly if you sit for long periods of time  If you travel by car or work at a desk, move and stretch in your seat several times each hour  In an airplane, get up and walk every hour  Exercise your legs while sitting by raising and lowering your heels  Keep your toes on the floor while you do this  You can also raise and lower your toes while keeping your heels on the floor  Also tighten and release your leg muscles while sitting  ¨ Exercise regularly  to help increase your blood flow  Walking is a good low-impact exercise  Talk to your healthcare provider about the best exercise plan for you  · Guidelines for people at high risk for DVT:      ¨ Take blood thinner medicines as directed  Your healthcare provider may recommend blood thinners and other medicines to help prevent blood clots  ¨ Wear pressure stockings as directed  The stockings are tight and put pressure on your legs  This improves blood flow and helps prevent clots   Wear the stockings during the day  Do not wear them when you sleep  ¨ Elevate your legs  above the level of your heart as often as you can  This will help decrease swelling and pain  Prop your legs on pillows or blankets to keep them elevated comfortably  ¨ Get up and move as directed after surgery or an injury, or during an illness  Early and regular movement can help decrease your risk for DVT by helping to increase your blood flow  Ask your healthcare provider what type of activity you need and how often you should do it  ¨ Change body positions often if you are bedridden  Ask for help to change your position every 1 to 2 hours  Follow up with your healthcare provider as directed:  Write down your questions so you remember to ask them during your visits  © 2017 [facility] Information is for End User's use only and may not be sold, redistributed or otherwise used for commercial purposes  All illustrations and images included in CareNotes® are the copyrighted property of A D [employer] M [employer]  or Jerel Medina  The above information is an  only  It is not intended as medical advice for individual conditions or treatments  Talk to your doctor, nurse or pharmacist before following any medical regimen to see if it is safe and effective for you

## 2018-07-06 NOTE — ANESTHESIA PROCEDURE NOTES
Peripheral Block    Patient location during procedure: holding area  Start time: 7/6/2018 2:59 PM  Removal time: 7/6/2018 3:14 PM  Staffing  Anesthesiologist: Dondra Angelucci  Performed: anesthesiologist   Preanesthetic Checklist  Completed: patient identified, site marked, surgical consent, pre-op evaluation, timeout performed, IV checked, risks and benefits discussed and monitors and equipment checked  Peripheral Block  Patient position: prone  Prep: Betadine  Patient monitoring: heart rate, frequent blood pressure checks and continuous pulse ox  Block type: popliteal (saphenous)  Laterality: left  Injection technique: single-shot  Procedures: ultrasound guided and nerve stimulator  Ultrasound permanent image saved  Local infiltration: ropivacaine  Infiltration strength: 0 5 %  Dose: 30 mL

## 2018-07-10 ENCOUNTER — OFFICE VISIT (OUTPATIENT)
Dept: FAMILY MEDICINE CLINIC | Facility: CLINIC | Age: 33
End: 2018-07-10
Payer: COMMERCIAL

## 2018-07-10 VITALS
BODY MASS INDEX: 30.21 KG/M2 | SYSTOLIC BLOOD PRESSURE: 108 MMHG | HEIGHT: 61 IN | TEMPERATURE: 98 F | WEIGHT: 160 LBS | HEART RATE: 95 BPM | OXYGEN SATURATION: 97 % | DIASTOLIC BLOOD PRESSURE: 66 MMHG

## 2018-07-10 DIAGNOSIS — Z01.84 IMMUNITY STATUS TESTING: ICD-10-CM

## 2018-07-10 DIAGNOSIS — Z71.6 ENCOUNTER FOR SMOKING CESSATION COUNSELING: ICD-10-CM

## 2018-07-10 DIAGNOSIS — Z02.0 ENCOUNTER FOR PHYSICAL EXAMINATION OF STUDENT: Primary | ICD-10-CM

## 2018-07-10 DIAGNOSIS — Z11.1 PPD SCREENING TEST: ICD-10-CM

## 2018-07-10 PROCEDURE — 99395 PREV VISIT EST AGE 18-39: CPT | Performed by: PHYSICIAN ASSISTANT

## 2018-07-10 PROCEDURE — 86580 TB INTRADERMAL TEST: CPT

## 2018-07-10 RX ORDER — RIVAROXABAN 10 MG/1
TABLET, FILM COATED ORAL
COMMUNITY
Start: 2018-06-28 | End: 2018-11-21

## 2018-07-10 RX ORDER — BUPROPION HYDROCHLORIDE 150 MG/1
150 TABLET ORAL DAILY
Qty: 90 TABLET | Refills: 0 | Status: SHIPPED | OUTPATIENT
Start: 2018-07-10 | End: 2018-10-18 | Stop reason: SDUPTHER

## 2018-07-10 NOTE — PROGRESS NOTES
H&P     Carlita Goncalves [facility] 33 y o  female   Date:  7/10/2018      Assessment and Plan:    Israel Porter was seen today for physical exam     Diagnoses and all orders for this visit:    Encounter for physical examination of student  - forms completed without restrictions   - hep b series started, needs second dose on or after July 15  - up to date on gyn/female well exams    - UTD on dental appts     Immunity status testing  - immune to measles, rubeola, mumps, varicella    PPD screening test  -     TB Skin Test; return in 48-72 hours for read than again in 1 week for second step    Encounter for smoking cessation counseling  -     buPROPion (WELLBUTRIN XL) 150 mg 24 hr tablet; Take 1 tablet (150 mg total) by mouth daily  - set quit date  - trial 3-6 months wellbutrin   - did not tolerate chantix, will not consider nicotine patches       HPI:  Chief Complaint   Patient presents with    Physical Exam     HPI   Patient is a 32 yo female with PMH below who 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  He had labs done for immunity titers  She recently is postop L ankle reconstruction and is on prophylactic xarelto x 6 months  She is still smoking 1/2-1 pack per day  She really wants to quit but is having difficulty, she did not tolerate chantix  She uses prilosec with controlled acid reflux  She has no concerns for today's visit  ROS: Review of Systems   Constitutional: Negative for chills, fatigue, fever and unexpected weight change  HENT: Negative for congestion, ear pain, hearing loss, nosebleeds, sore throat and trouble swallowing  Eyes: Negative for pain, discharge and visual disturbance  Respiratory: Negative for cough, shortness of breath and wheezing  Cardiovascular: Negative for chest pain, palpitations and leg swelling  Gastrointestinal: Negative for abdominal pain, blood in stool, constipation, diarrhea, nausea and vomiting  Endocrine: Negative for cold intolerance and heat intolerance  Genitourinary: Negative for difficulty urinating, dysuria and hematuria  Musculoskeletal: Positive for arthralgias (s/p L ankle reconstruction) and gait problem (using walker s/p L ankle surgery )  Negative for myalgias and neck pain  Skin: Negative for color change, rash and wound  Neurological: Negative for dizziness, syncope, weakness, light-headedness and headaches  Hematological: Negative for adenopathy  Does not bruise/bleed easily  Psychiatric/Behavioral: Positive for sleep disturbance  Negative for confusion  The patient is nervous/anxious  Past Medical History:   Diagnosis Date    Anxiety     Cancer [facility])     stage 1 melanoma left shoulder/ right breast    Elevated blood pressure reading     last assessed: 03/02/16    Exercises 5 to 6 times per week     not currently due to ankle injury    Fall     left ankle    Family history of reaction to anesthesia     "daughter right after oral surgery had fever 105 and was told allergy to one of the anesthesia meds"    GERD (gastroesophageal reflux disease)     Headache     History of anemia     History of bariatric surgery     lost 155lbs in 11 months    History of bone cyst     cyst of joint of ankle or foot, last assessed: 05/05/15    History of hypothyroidism     "told no longer has it"    History of pneumonia     Irritable bowel syndrome     Migraines     Motion sickness     Palpitations     last assessed: 03/02/16, pt reports none recent    Seasonal allergies     Sleep related teeth grinding     Tachycardia     last assessed: 04/04/17,,pt reports no longer has this      Uses brace     left ankle    Vitamin B12 deficiency     pt reports normal now    Vitamin D deficiency     most recent labs pt reports normal    Wears glasses      Patient Active Problem List   Diagnosis    Vitamin D deficiency    Seasonal allergies    GERD (gastroesophageal reflux disease)    Postgastrectomy malabsorption    Low serum vitamin B12    Bariatric surgery status    Gastric band slippage    Vitamin B12 deficiency    Closed fracture of base of fifth metatarsal bone    Closed fracture of neck of radius       Past Surgical History:   Procedure Laterality Date    ARTHROSCOPY ANKLE Left 2018    Procedure: ANKLE ARTHROSCOPIC EXPLORATION; REPEAT STRESSED IMAGING;  Surgeon: Mike Rodrigez DPM;  [facility];  Service: Podiatry     SECTION      CHOLECYSTECTOMY      COLONOSCOPY      ESOPHAGOGASTRODUODENOSCOPY      FOOT SURGERY Left     cyst     FOOT SURGERY Right     cyst    GALLBLADDER SURGERY      HYSTERECTOMY      LAPAROSCOPIC GASTRIC BANDING  2010    UT EGD TRANSORAL BIOPSY SINGLE/MULTIPLE N/A 5/3/2017    Procedure: ESOPHAGOGASTRODUODENOSCOPY (EGD) with bx;  Surgeon: Abelardo Marsh MD;  [facility];   Service: Bariatrics    UT LAP GASTRIC BYPASS/BENJAMIN-EN-Y N/A 2017    Procedure: BYPASS GASTRIC  BENJAMIN-EN-Y LAPAROSCOPIC, intraoperative EGD;  Surgeon: Abelardo Marsh MD;  [facility];  Service: Bariatrics    REMOVAL GASTRIC BAND LAPAROSCOPIC      SKIN BIOPSY      SKIN CANCER EXCISION      right breast and left shoulder for melanoma    WISDOM TOOTH EXTRACTION         Social History     Social History    Marital status: /Civil Union     Spouse name: N/A    Number of children: N/A    Years of education: N/A     Occupational History          Social History Main Topics    Smoking status: Current Every Day Smoker     Packs/day: 1 00     Years: 12 00     Types: Cigarettes    Smokeless tobacco: Never Used      Comment: has just recently restarted/plans to try quit again    Alcohol use No    Drug use: No    Sexual activity: Not Asked     Other Topics Concern    None     Social History Narrative    Caffeine use    Living independently with spouse, and children    Two children    Uses safety equipment - seatbelts           Family History   Problem Relation Age of Onset    Atrial fibrillation Mother     Gallbladder disease Mother     No Known Problems Father     Hypertension Brother     Diabetes Family     Hypertension Family        Allergies   Allergen Reactions    Chantix [Varenicline] GI Intolerance         Current Outpatient Prescriptions:     Calcium 500-100 MG-UNIT CHEW, Chew daily  , Disp: , Rfl:     DAILY MULTIPLE VITAMINS/IRON TABS, Take by mouth daily  , Disp: , Rfl:     HYDROcodone-acetaminophen (NORCO) 5-325 mg per tablet, Take 1 tablet by mouth every 6 (six) hours as needed for pain for up to 10 days Max Daily Amount: 4 tablets, Disp: 24 tablet, Rfl: 0    omeprazole (PriLOSEC) 20 mg delayed release capsule, Take 1 capsule (20 mg total) by mouth 2 (two) times a day, Disp: 30 capsule, Rfl: 0    XARELTO 10 MG tablet, , Disp: , Rfl:     buPROPion (WELLBUTRIN XL) 150 mg 24 hr tablet, Take 1 tablet (150 mg total) by mouth daily, Disp: 90 tablet, Rfl: 0      Physical Exam:  /66 (BP Location: Left arm, Patient Position: Sitting, Cuff Size: Standard)   Pulse 95   Temp 98 °F (36 7 °C) (Tympanic)   Ht 5' 1" (1 549 m)   Wt 72 6 kg (160 lb)   SpO2 97%   BMI 30 23 kg/m²     Physical Exam   Constitutional: She is oriented to person, place, and time  Vital signs are normal  She appears well-developed and well-nourished  No distress  HENT:   Head: Normocephalic and atraumatic  Right Ear: Tympanic membrane, external ear and ear canal normal    Left Ear: Tympanic membrane, external ear and ear canal normal    Nose: Nose normal    Mouth/Throat: Oropharynx is clear and moist  No oropharyngeal exudate  Eyes: Conjunctivae and lids are normal  Pupils are equal, round, and reactive to light  Right eye exhibits no discharge  Left eye exhibits no discharge  Neck: Trachea normal and normal range of motion  Neck supple  No thyromegaly present  Cardiovascular: Normal rate, regular rhythm, S1 normal, S2 normal, normal heart sounds and intact distal pulses  Exam reveals no gallop  No murmur heard  Pulmonary/Chest: Effort normal and breath sounds normal  No respiratory distress  She has no wheezes  She has no rhonchi  She has no rales  Abdominal: Soft  Normal appearance and bowel sounds are normal  She exhibits no mass  There is no hepatosplenomegaly  There is no tenderness  Musculoskeletal: Normal range of motion  She exhibits deformity (L ankle wrapped from L toes to knee; using walker )  Lymphadenopathy:     She has no cervical adenopathy  Neurological: She is alert and oriented to person, place, and time  She has normal reflexes  No cranial nerve deficit or sensory deficit  Skin: Skin is warm and dry  No rash noted  No cyanosis  No pallor  Nails show no clubbing  Psychiatric: She has a normal mood and affect   Her behavior is normal  Cognition and memory are normal          Labs:  Lab Results   Component Value Date    WBC 11 35 (H) 06/15/2018    HGB 14 5 06/15/2018    HCT 43 4 06/15/2018    MCV 94 06/15/2018     06/15/2018     Lab Results   Component Value Date     06/15/2018    K 3 6 06/15/2018     06/15/2018    CO2 30 06/15/2018    ANIONGAP 7 06/15/2018    BUN 5 06/15/2018    CREATININE 0 64 06/15/2018    GLUCOSE 88 05/25/2018    GLUF 67 06/15/2018    CALCIUM 9 6 06/15/2018    AST 17 05/25/2018    ALT 16 05/25/2018    ALKPHOS 110 05/25/2018    PROT 8 3 (H) 05/25/2018    BILITOT 0 36 05/25/2018    EGFR 119 06/15/2018

## 2018-07-10 NOTE — PATIENT INSTRUCTIONS

## 2018-07-12 LAB
INDURATION: 0 MM
TB SKIN TEST: NEGATIVE

## 2018-07-23 ENCOUNTER — CLINICAL SUPPORT (OUTPATIENT)
Dept: FAMILY MEDICINE CLINIC | Facility: CLINIC | Age: 33
End: 2018-07-23
Payer: COMMERCIAL

## 2018-07-23 DIAGNOSIS — Z11.1 PPD SCREENING TEST: ICD-10-CM

## 2018-07-23 DIAGNOSIS — Z23 HEPATITIS B VACCINATION ADMINISTERED AT CURRENT VISIT: Primary | ICD-10-CM

## 2018-07-23 PROCEDURE — 86580 TB INTRADERMAL TEST: CPT

## 2018-07-23 PROCEDURE — 90471 IMMUNIZATION ADMIN: CPT

## 2018-07-23 PROCEDURE — 90746 HEPB VACCINE 3 DOSE ADULT IM: CPT

## 2018-07-25 LAB
INDURATION: NORMAL MM
TB SKIN TEST: NEGATIVE

## 2018-09-27 ENCOUNTER — OFFICE VISIT (OUTPATIENT)
Dept: BARIATRICS | Facility: CLINIC | Age: 33
End: 2018-09-27
Payer: COMMERCIAL

## 2018-09-27 VITALS
BODY MASS INDEX: 28.71 KG/M2 | TEMPERATURE: 98.2 F | SYSTOLIC BLOOD PRESSURE: 116 MMHG | DIASTOLIC BLOOD PRESSURE: 70 MMHG | WEIGHT: 156 LBS | RESPIRATION RATE: 18 BRPM | HEIGHT: 62 IN | HEART RATE: 82 BPM

## 2018-09-27 DIAGNOSIS — K21.9 GASTROESOPHAGEAL REFLUX DISEASE WITHOUT ESOPHAGITIS: ICD-10-CM

## 2018-09-27 DIAGNOSIS — K91.2 POSTSURGICAL MALABSORPTION: Primary | ICD-10-CM

## 2018-09-27 DIAGNOSIS — Z90.3 POSTGASTRECTOMY MALABSORPTION: ICD-10-CM

## 2018-09-27 DIAGNOSIS — E55.9 VITAMIN D DEFICIENCY: ICD-10-CM

## 2018-09-27 DIAGNOSIS — Z98.84 BARIATRIC SURGERY STATUS: ICD-10-CM

## 2018-09-27 DIAGNOSIS — K91.2 POSTGASTRECTOMY MALABSORPTION: ICD-10-CM

## 2018-09-27 DIAGNOSIS — E53.8 LOW SERUM VITAMIN B12: ICD-10-CM

## 2018-09-27 PROCEDURE — 99214 OFFICE O/P EST MOD 30 MIN: CPT | Performed by: PHYSICIAN ASSISTANT

## 2018-09-27 NOTE — ASSESSMENT & PLAN NOTE
She has had this controlled on ppi/advised on gradual taper to off  She notes she is off xarelto now

## 2018-09-27 NOTE — PATIENT INSTRUCTIONS
Add an extra 2000 IU vitamin D3 daily to current bariatric supplements and an extra 500 mcg sublingual (under-the-tongue) vitamin B12 daily-you may want to get labs done at the end of November after adding these in  Do NOT get ferritin or iron % (iron studies done) since they were done elsewhere    Taper off the omeprazole  If you unable to come off it after 3-4 months, please make a follow-up with us to further evaluate this  Follow-up in one year  We kindly ask that you arrive 15 minutes before your scheduled appointment time with your provider to allow you to be roomed, have your vital signs checked and your chart updated by our staff  We thank you for your patience at your visit  Follow diet as discussed  Follow  vitamin and mineral recommendations as reviewed with you  Exercise as tolerated    If you have gotten a lab slip at this visit, please note that most labs are FASTING-but you need to drink water the night before and the morning before your labs are done  It is HIGHLY RECOMMENDED that you check with your insurance to make sure all the labs ordered are covered by your individual insurance policy  This is especially important if you also get labs done by other providers outside of [facility]  You want to avoid having duplicate labs done  Note you will be given a lab slip AFTER  your annual visit next year to check your vitamin and mineral levels  You will not need to make a second appointment after the labs are received/reviewed  You will receive a letter/and or phone call with your results  Most labs do NOT honor a lab slip dated one year in advance now  Call our office if he have any problems with abdominal pain especially if associated with fever, chills, nausea, vomiting or any other concerns  All  Post-bariatric surgery patients should be aware that very small quantities of any alcohol  can cause impairment and it is very possible not to feel the effect   The effect can be in the system for several hours  It is also a stomach irritant  It is advised to AVOID alcohol, Nonsteroidal antiinflammatory drugs (NSAIDS) and nicotine of all forms   Any of these can cause stomach irritation/pain

## 2018-09-27 NOTE — ASSESSMENT & PLAN NOTE
Malabsorption- patient is at risk for malabsorption of vitamins/minerals secondary to malabsorption from her procedure and restriction of intakes  Reviewed current supplements and advised on same    She is taking 4 bariatric fusion and one calcium  She did not add extra vitamin D-and had temporarily added extra vitamin B12 but did not continue  -advised on same  She has not gotten her routine labs yet  Advised to add those supplements in and get labs next month

## 2018-09-27 NOTE — ASSESSMENT & PLAN NOTE
She had history of gastric banding which was removed by an outside institution-s/p Fe-En-Y Gastric Bypass with Dr Hillery Duane on 7/25/2017  Overall doing Well  Initial: 256 lb  Current: 156 lb  EWL: 83% which is above average  Chris: Current  Current BMI is Body mass index is 28 53 kg/m²      Tolerating a regular diet-yes  Eating at least 60 grams of protein per day-yes  Following 30/60 minute rule with liquids-yes  Drinking at least 64 ounces of fluid per day-yes  Drinking carbonated beverages-no  Sufficient exercise-walking /injured ankle-advised exercise if/as tolerated  Using NSAIDs regularly-no  Using nicotine-no  Using alcohol-no

## 2018-10-18 DIAGNOSIS — Z71.6 ENCOUNTER FOR SMOKING CESSATION COUNSELING: ICD-10-CM

## 2018-10-18 RX ORDER — BUPROPION HYDROCHLORIDE 150 MG/1
TABLET ORAL
Qty: 90 TABLET | Refills: 0 | Status: SHIPPED | OUTPATIENT
Start: 2018-10-18 | End: 2018-11-21

## 2018-11-20 ENCOUNTER — HOSPITAL ENCOUNTER (EMERGENCY)
Facility: HOSPITAL | Age: 33
Discharge: HOME | End: 2018-11-21
Attending: EMERGENCY MEDICINE
Payer: COMMERCIAL

## 2018-11-20 DIAGNOSIS — K52.9 ENTERITIS: Primary | ICD-10-CM

## 2018-11-20 DIAGNOSIS — R11.0 NAUSEA: ICD-10-CM

## 2018-11-20 DIAGNOSIS — Z98.84 S/P GASTRIC BYPASS: ICD-10-CM

## 2018-11-20 DIAGNOSIS — R63.0 LOSS OF APPETITE: ICD-10-CM

## 2018-11-20 LAB
BASOPHILS # BLD AUTO: 0.02 THOUSANDS/ΜL (ref 0–0.1)
BASOPHILS NFR BLD AUTO: 0 % (ref 0–1)
EOSINOPHIL # BLD AUTO: 0.17 THOUSAND/ΜL (ref 0–0.61)
EOSINOPHIL NFR BLD AUTO: 1 % (ref 0–6)
ERYTHROCYTE [DISTWIDTH] IN BLOOD BY AUTOMATED COUNT: 11.9 % (ref 11.6–15.1)
HCT VFR BLD AUTO: 41.1 % (ref 34.8–46.1)
HGB BLD-MCNC: 13.8 G/DL (ref 11.5–15.4)
IMM GRANULOCYTES # BLD AUTO: 0.03 THOUSAND/UL (ref 0–0.2)
IMM GRANULOCYTES NFR BLD AUTO: 0 % (ref 0–2)
LYMPHOCYTES # BLD AUTO: 2.05 THOUSANDS/ΜL (ref 0.6–4.47)
LYMPHOCYTES NFR BLD AUTO: 17 % (ref 14–44)
MCH RBC QN AUTO: 31.9 PG (ref 26.8–34.3)
MCHC RBC AUTO-ENTMCNC: 33.6 G/DL (ref 31.4–37.4)
MCV RBC AUTO: 95 FL (ref 82–98)
MONOCYTES # BLD AUTO: 0.62 THOUSAND/ΜL (ref 0.17–1.22)
MONOCYTES NFR BLD AUTO: 5 % (ref 4–12)
NEUTROPHILS # BLD AUTO: 9.3 THOUSANDS/ΜL (ref 1.85–7.62)
NEUTS SEG NFR BLD AUTO: 77 % (ref 43–75)
NRBC BLD AUTO-RTO: 0 /100 WBCS
PLATELET # BLD AUTO: 239 THOUSANDS/UL (ref 149–390)
PMV BLD AUTO: 9.8 FL (ref 8.9–12.7)
RBC # BLD AUTO: 4.32 MILLION/UL (ref 3.81–5.12)
WBC # BLD AUTO: 12.19 THOUSAND/UL (ref 4.31–10.16)

## 2018-11-20 PROCEDURE — 96374 THER/PROPH/DIAG INJ IV PUSH: CPT

## 2018-11-20 PROCEDURE — 80053 COMPREHEN METABOLIC PANEL: CPT | Performed by: EMERGENCY MEDICINE

## 2018-11-20 PROCEDURE — 36415 COLL VENOUS BLD VENIPUNCTURE: CPT | Performed by: EMERGENCY MEDICINE

## 2018-11-20 PROCEDURE — 96361 HYDRATE IV INFUSION ADD-ON: CPT

## 2018-11-20 PROCEDURE — 99284 EMERGENCY DEPT VISIT MOD MDM: CPT

## 2018-11-20 PROCEDURE — 85025 COMPLETE CBC W/AUTO DIFF WBC: CPT | Performed by: EMERGENCY MEDICINE

## 2018-11-20 PROCEDURE — 96375 TX/PRO/DX INJ NEW DRUG ADDON: CPT

## 2018-11-20 PROCEDURE — 81025 URINE PREGNANCY TEST: CPT | Performed by: EMERGENCY MEDICINE

## 2018-11-20 RX ORDER — MORPHINE SULFATE 4 MG/ML
4 INJECTION, SOLUTION INTRAMUSCULAR; INTRAVENOUS ONCE
Status: COMPLETED | OUTPATIENT
Start: 2018-11-20 | End: 2018-11-20

## 2018-11-20 RX ORDER — ONDANSETRON 2 MG/ML
4 INJECTION INTRAMUSCULAR; INTRAVENOUS ONCE
Status: COMPLETED | OUTPATIENT
Start: 2018-11-20 | End: 2018-11-20

## 2018-11-20 RX ADMIN — MORPHINE SULFATE 4 MG: 4 INJECTION, SOLUTION INTRAMUSCULAR; INTRAVENOUS at 23:33

## 2018-11-20 RX ADMIN — SODIUM CHLORIDE 1000 ML: 0.9 INJECTION, SOLUTION INTRAVENOUS at 23:32

## 2018-11-20 RX ADMIN — ONDANSETRON 4 MG: 2 INJECTION INTRAMUSCULAR; INTRAVENOUS at 23:32

## 2018-11-21 ENCOUNTER — APPOINTMENT (EMERGENCY)
Dept: CT IMAGING | Facility: HOSPITAL | Age: 33
End: 2018-11-21
Payer: COMMERCIAL

## 2018-11-21 VITALS
HEART RATE: 74 BPM | SYSTOLIC BLOOD PRESSURE: 106 MMHG | OXYGEN SATURATION: 98 % | DIASTOLIC BLOOD PRESSURE: 50 MMHG | BODY MASS INDEX: 26.52 KG/M2 | RESPIRATION RATE: 18 BRPM | WEIGHT: 145 LBS | TEMPERATURE: 98.3 F

## 2018-11-21 LAB
ALBUMIN SERPL BCP-MCNC: 4 G/DL (ref 3.5–5)
ALP SERPL-CCNC: 100 U/L (ref 46–116)
ALT SERPL W P-5'-P-CCNC: 16 U/L (ref 12–78)
ANION GAP SERPL CALCULATED.3IONS-SCNC: 9 MMOL/L (ref 4–13)
AST SERPL W P-5'-P-CCNC: 13 U/L (ref 5–45)
BILIRUB SERPL-MCNC: 0.39 MG/DL (ref 0.2–1)
BILIRUB UR QL STRIP: NEGATIVE
BUN SERPL-MCNC: 7 MG/DL (ref 5–25)
CALCIUM SERPL-MCNC: 9.2 MG/DL (ref 8.3–10.1)
CHLORIDE SERPL-SCNC: 101 MMOL/L (ref 100–108)
CLARITY UR: CLEAR
CO2 SERPL-SCNC: 28 MMOL/L (ref 21–32)
COLOR UR: YELLOW
COLOR, POC: YELLOW
CREAT SERPL-MCNC: 0.67 MG/DL (ref 0.6–1.3)
EXT PREG TEST URINE: NEGATIVE
GFR SERPL CREATININE-BSD FRML MDRD: 117 ML/MIN/1.73SQ M
GLUCOSE SERPL-MCNC: 98 MG/DL (ref 65–140)
GLUCOSE UR STRIP-MCNC: NEGATIVE MG/DL
HGB UR QL STRIP.AUTO: NEGATIVE
KETONES UR STRIP-MCNC: NEGATIVE MG/DL
LACTATE SERPL-SCNC: 0.7 MMOL/L (ref 0.5–2)
LEUKOCYTE ESTERASE UR QL STRIP: NEGATIVE
NITRITE UR QL STRIP: NEGATIVE
PH UR STRIP.AUTO: 7 [PH] (ref 4.5–8)
POTASSIUM SERPL-SCNC: 3.8 MMOL/L (ref 3.5–5.3)
PROT SERPL-MCNC: 7.7 G/DL (ref 6.4–8.2)
PROT UR STRIP-MCNC: NEGATIVE MG/DL
SODIUM SERPL-SCNC: 138 MMOL/L (ref 136–145)
SP GR UR STRIP.AUTO: 1.01 (ref 1–1.03)
UROBILINOGEN UR QL STRIP.AUTO: 0.2 E.U./DL

## 2018-11-21 PROCEDURE — 36415 COLL VENOUS BLD VENIPUNCTURE: CPT | Performed by: EMERGENCY MEDICINE

## 2018-11-21 PROCEDURE — 96376 TX/PRO/DX INJ SAME DRUG ADON: CPT

## 2018-11-21 PROCEDURE — 74177 CT ABD & PELVIS W/CONTRAST: CPT

## 2018-11-21 PROCEDURE — 83605 ASSAY OF LACTIC ACID: CPT | Performed by: EMERGENCY MEDICINE

## 2018-11-21 PROCEDURE — 81003 URINALYSIS AUTO W/O SCOPE: CPT

## 2018-11-21 PROCEDURE — 96361 HYDRATE IV INFUSION ADD-ON: CPT

## 2018-11-21 PROCEDURE — 99244 OFF/OP CNSLTJ NEW/EST MOD 40: CPT | Performed by: SURGERY

## 2018-11-21 RX ORDER — MORPHINE SULFATE 4 MG/ML
4 INJECTION, SOLUTION INTRAMUSCULAR; INTRAVENOUS ONCE
Status: COMPLETED | OUTPATIENT
Start: 2018-11-21 | End: 2018-11-21

## 2018-11-21 RX ORDER — SODIUM CHLORIDE 9 MG/ML
125 INJECTION, SOLUTION INTRAVENOUS CONTINUOUS
Status: DISCONTINUED | OUTPATIENT
Start: 2018-11-21 | End: 2018-11-21 | Stop reason: HOSPADM

## 2018-11-21 RX ORDER — OXYCODONE HYDROCHLORIDE AND ACETAMINOPHEN 5; 325 MG/1; MG/1
1 TABLET ORAL EVERY 8 HOURS PRN
Qty: 5 TABLET | Refills: 0 | Status: SHIPPED | OUTPATIENT
Start: 2018-11-21 | End: 2018-11-26

## 2018-11-21 RX ORDER — ONDANSETRON 4 MG/1
4 TABLET, ORALLY DISINTEGRATING ORAL EVERY 8 HOURS PRN
Qty: 20 TABLET | Refills: 0 | Status: SHIPPED | OUTPATIENT
Start: 2018-11-21 | End: 2020-11-02

## 2018-11-21 RX ORDER — ONDANSETRON 2 MG/ML
4 INJECTION INTRAMUSCULAR; INTRAVENOUS ONCE
Status: COMPLETED | OUTPATIENT
Start: 2018-11-21 | End: 2018-11-21

## 2018-11-21 RX ADMIN — IOHEXOL 25 ML: 240 INJECTION, SOLUTION INTRATHECAL; INTRAVASCULAR; INTRAVENOUS; ORAL at 00:36

## 2018-11-21 RX ADMIN — SODIUM CHLORIDE 125 ML/HR: 0.9 INJECTION, SOLUTION INTRAVENOUS at 01:22

## 2018-11-21 RX ADMIN — ONDANSETRON 4 MG: 2 INJECTION INTRAMUSCULAR; INTRAVENOUS at 01:22

## 2018-11-21 RX ADMIN — IOHEXOL 100 ML: 350 INJECTION, SOLUTION INTRAVENOUS at 00:36

## 2018-11-21 RX ADMIN — MORPHINE SULFATE 4 MG: 4 INJECTION, SOLUTION INTRAMUSCULAR; INTRAVENOUS at 01:15

## 2018-11-21 NOTE — CONSULTS
Consultation - Bariatric Surgery   Lisbeth Nicole [facility] 29 y o  female MRN: [id number]  Unit/Bed#: ED 16 Encounter: [id number]      Assessment/Plan     Assessment:  81-year-old female evaluated for mild to moderate upper abdominal pain  Patient has history of gastric bypass 1 year ago  Patient in the emergency room is hemodynamically stable, afebrile  Her abdomen exam is benign  Her CT scan of the abdomen showed mild enteritis at the jejunal-jejunal anastomosis but no evidence of leak, perforation, internal hernia or intussusception  WBC was 12 with a normal lactate  I discussed with the patient the possible causes of abdominal pain after gastric bypass  I explained to the patient that she does not in any surgical intervention  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  She wanted to go home and sleep before had tests after her pain completely resolved with some medication  Advised the patient to call the office if her pain returns or is not controlled with oral pain medication  Patient verbalized understanding and agreed with plan to go home  Plan:  Discharge home on oral Percocet  Advised the patient to stay on a liquid diet for the next 3 days and then slowly advanced diet to solid  History of Present Illness   Physician Requesting Consult: Allen George DO  Reason for Consult / Principal Problem:  Abdominal pain with history of gastric bypass  HPI: Lisa Baumann is a 29y o  year old female who presents with left upper abdomen pain that started about 20 minutes after having lunch yesterday  Patient reported mild nausea and 1 episode of nonbloody vomiting  Her pain was steady in nature and became worse in intensity which brought her to the ED department  Patient denies epigastric pain after eating on a regular basis  She denies any recent similar episode of abdominal pain  Of note patient is a current cigarette smoker    She was advised to quit and a multiple adverse consequences of smoking with a gastric bypass were explained to her  Patient reported passing flatus had a bowel movement 2 days ago which is normal for her  She has a history of gastric bypass in July 2 1017th  She has done very well from a weight-loss standpoint and has lost over 100 lb  Consults    Review of Systems   Constitutional: Negative  HENT: Negative  Respiratory: Negative  Cardiovascular: Negative  Gastrointestinal: Positive for abdominal pain and nausea  Negative for anal bleeding, blood in stool, constipation and diarrhea  Endocrine: Negative  Genitourinary: Negative  Musculoskeletal: Negative  Historical Information   Past Medical History:   Diagnosis Date    Anxiety     Cancer ([facility] [address] )     stage 1 melanoma left shoulder/ right breast    Elevated blood pressure reading     last assessed: 03/02/16    Exercises 5 to 6 times per week     not currently due to ankle injury    Fall     left ankle    Family history of reaction to anesthesia     "daughter right after oral surgery had fever 105 and was told allergy to one of the anesthesia meds"    GERD (gastroesophageal reflux disease)     Headache     History of anemia     History of bariatric surgery     lost 155lbs in 11 months    History of bone cyst     cyst of joint of ankle or foot, last assessed: 05/05/15    History of hypothyroidism     "told no longer has it"    History of pneumonia     Irritable bowel syndrome     Migraines     Motion sickness     Palpitations     last assessed: 03/02/16, pt reports none recent    Seasonal allergies     Sleep related teeth grinding     Tachycardia     last assessed: 04/04/17,,pt reports no longer has this      Uses brace     left ankle    Vitamin B12 deficiency     pt reports normal now    Vitamin D deficiency     most recent labs pt reports normal    Wears glasses      Past Surgical History:   Procedure Laterality Date    ARTHROSCOPY ANKLE Left 7/6/2018 Procedure: ANKLE ARTHROSCOPIC EXPLORATION; REPEAT STRESSED IMAGING;  Surgeon: Levi Horner DPM;  [facility];  Service: Podiatry     SECTION      CHOLECYSTECTOMY      COLONOSCOPY      ESOPHAGOGASTRODUODENOSCOPY      FOOT SURGERY Left     cyst     FOOT SURGERY Right     cyst    GALLBLADDER SURGERY      HYSTERECTOMY      LAPAROSCOPIC GASTRIC BANDING      [place] EGD TRANSORAL BIOPSY SINGLE/MULTIPLE N/A 5/3/2017    Procedure: ESOPHAGOGASTRODUODENOSCOPY (EGD) with bx;  Surgeon: Suma Khan MD;  [facility]; Service: Bariatrics    CA LAP GASTRIC BYPASS/BENJAMIN-EN-Y N/A 2017    Procedure: BYPASS GASTRIC  BENJAMIN-EN-Y LAPAROSCOPIC, intraoperative EGD;  Surgeon: Suma Khan MD;  [facility];  Service: Bariatrics    REMOVAL GASTRIC BAND LAPAROSCOPIC      SKIN BIOPSY      SKIN CANCER EXCISION      right breast and left shoulder for melanoma    WISDOM TOOTH EXTRACTION       Social History   History   Alcohol Use No     History   Drug Use No     History   Smoking Status    Current Every Day Smoker    Packs/day: 1 00    Years: 12 00    Types: Cigarettes   Smokeless Tobacco    Never Used     Comment: has just recently restarted/plans to try quit again     Family History: non-contributory    Meds/Allergies   all current active meds have been reviewed  Allergies   Allergen Reactions    Chantix [Varenicline] GI Intolerance       Objective   Vitals: Blood pressure 104/52, pulse 72, temperature 98 3 °F (36 8 °C), resp  rate 18, weight 65 8 kg (145 lb), SpO2 98 %  ,Body mass index is 26 52 kg/m²        Intake/Output Summary (Last 24 hours) at 18 0208  Last data filed at 18 0118   Gross per 24 hour   Intake             1000 ml   Output                0 ml   Net             1000 ml     Invasive Devices     Peripheral Intravenous Line            Peripheral IV 18 Left Antecubital less than 1 day                Physical Exam   Constitutional: She is oriented to person, place, and time  She appears well-developed and well-nourished  No distress  HENT:   Head: Normocephalic and atraumatic  Eyes: Pupils are equal, round, and reactive to light  Conjunctivae are normal    Cardiovascular: Normal rate and regular rhythm  Pulmonary/Chest: Effort normal and breath sounds normal    Abdominal: Soft  Bowel sounds are normal  She exhibits no distension  There is no tenderness  There is no rebound and no guarding  Abdomen soft, nontender and nondistended  No peritoneal signs  All her prior incisions were well healed without signs of hernias  Musculoskeletal: She exhibits no edema  Neurological: She is alert and oriented to person, place, and time  Skin: Skin is warm and dry  She is not diaphoretic  Psychiatric: She has a normal mood and affect  Her behavior is normal         Lab Results: I have personally reviewed pertinent labs  Imaging Studies: I have personally reviewed pertinent films in PACS  EKG, Pathology, and Other Studies: I have personally reviewed pertinent films in PACS  VTE Prophylaxis: Sequential compression device Angy Kilgore     Code Status: Prior  Advance Directive and Living Will:      Power of :    POLST:      Counseling / Coordination of Care  Total floor / unit time spent today 30 minutes  Greater than 50% of total time was spent with the patient and / or family counseling and / or coordination of care   A description of the counseling / coordination of care:

## 2018-11-21 NOTE — ED PROVIDER NOTES
History  Chief Complaint   Patient presents with    Abdominal Pain     Pt states "I ate luncha and started getting pain at my belly button and below  lower back hurts  vomitted once"     30 yo female with hx of gastric bypass by Dr June Winston July 2017 who presents with abdominal pain  Pt ate leftovers for lunch (no one else at them but they were both fine last night when they ate it) and 30 min later started with periumbilical pain that then radiated to lower abd  Associated nausea and loss of appetite  History provided by:  Patient   used: No    Abdominal Pain   Pain location:  RLQ, LLQ and periumbilical  Pain quality: aching    Pain severity:  Moderate  Onset quality:  Gradual  Duration:  12 hours  Timing:  Constant  Progression:  Unchanged  Chronicity:  New  Relieved by:  Nothing  Worsened by: Movement and palpation  Ineffective treatments:  None tried  Associated symptoms: anorexia and nausea    Associated symptoms: no chest pain, no chills, no constipation, no diarrhea, no dysuria, no fatigue, no fever, no hematuria, no shortness of breath, no sore throat, no vaginal bleeding, no vaginal discharge and no vomiting    Associated symptoms comment:  No BM today  Risk factors: multiple surgeries (elke, gastric bypass)        Prior to Admission Medications   Prescriptions Last Dose Informant Patient Reported? Taking?    Calcium 500-100 MG-UNIT CHEW   Yes Yes   Sig: Chew daily     DAILY MULTIPLE VITAMINS/IRON TABS   Yes Yes   Sig: Take by mouth daily        Facility-Administered Medications: None       Past Medical History:   Diagnosis Date    Anxiety     Cancer ([facility] [address] )     stage 1 melanoma left shoulder/ right breast    Elevated blood pressure reading     last assessed: 03/02/16    Exercises 5 to 6 times per week     not currently due to ankle injury    Fall     left ankle    Family history of reaction to anesthesia     "daughter right after oral surgery had fever 105 and was told allergy to one of the anesthesia meds"    GERD (gastroesophageal reflux disease)     Headache     History of anemia     History of bariatric surgery     lost 155lbs in 11 months    History of bone cyst     cyst of joint of ankle or foot, last assessed: 05/05/15    History of hypothyroidism     "told no longer has it"    History of pneumonia     Irritable bowel syndrome     Migraines     Motion sickness     Palpitations     last assessed: 16, pt reports none recent    Seasonal allergies     Sleep related teeth grinding     Tachycardia     last assessed: 17,,pt reports no longer has this   Uses brace     left ankle    Vitamin B12 deficiency     pt reports normal now    Vitamin D deficiency     most recent labs pt reports normal    Wears glasses        Past Surgical History:   Procedure Laterality Date    ARTHROSCOPY ANKLE Left 2018    Procedure: ANKLE ARTHROSCOPIC EXPLORATION; REPEAT STRESSED IMAGING;  Surgeon: Petty Gunter DPM;  Location: [facility];  Service: Podiatry     SECTION      CHOLECYSTECTOMY      COLONOSCOPY      ESOPHAGOGASTRODUODENOSCOPY      FOOT SURGERY Left     cyst     FOOT SURGERY Right     cyst    GALLBLADDER SURGERY      HYSTERECTOMY      LAPAROSCOPIC GASTRIC BANDING      AK EGD TRANSORAL BIOPSY SINGLE/MULTIPLE N/A 5/3/2017    Procedure: ESOPHAGOGASTRODUODENOSCOPY (EGD) with bx;  Surgeon: Adam Mackenzie MD;  Location: [facility];   Service: Bariatrics    AK LAP GASTRIC BYPASS/BENJAMIN-EN-Y N/A 2017    Procedure: BYPASS GASTRIC  BENJAMIN-EN-Y LAPAROSCOPIC, intraoperative EGD;  Surgeon: Adam Mackenzie MD;  Location: [facility];  Service: Bariatrics    REMOVAL GASTRIC BAND LAPAROSCOPIC      SKIN BIOPSY      SKIN CANCER EXCISION      right breast and left shoulder for melanoma    WISDOM TOOTH EXTRACTION         Family History   Problem Relation Age of Onset    Atrial fibrillation Mother     Gallbladder disease Mother    Piero Barbour No Known Problems Father     Hypertension Brother     Diabetes Family     Hypertension Family      I have reviewed and agree with the history as documented  Social History   Substance Use Topics    Smoking status: Current Every Day Smoker     Packs/day: 1 00     Years: 12 00     Types: Cigarettes    Smokeless tobacco: Never Used      Comment: has just recently restarted/plans to try quit again    Alcohol use No        Review of Systems   Constitutional: Positive for appetite change  Negative for chills, fatigue and fever  HENT: Negative for sore throat  Eyes: Negative for visual disturbance  Respiratory: Negative for shortness of breath  Cardiovascular: Negative for chest pain  Gastrointestinal: Positive for abdominal pain (lower abd), anorexia and nausea  Negative for constipation, diarrhea and vomiting  No BM today   Genitourinary: Negative for dysuria, frequency, hematuria, vaginal bleeding and vaginal discharge  Musculoskeletal: Negative for back pain, neck pain and neck stiffness  Skin: Negative for pallor and rash  Allergic/Immunologic: Negative for immunocompromised state  Neurological: Negative for light-headedness and headaches  Psychiatric/Behavioral: Negative for confusion  All other systems reviewed and are negative  Physical Exam  Physical Exam   Constitutional: She is oriented to person, place, and time  She appears well-developed and well-nourished  No distress  HENT:   Head: Normocephalic and atraumatic  Right Ear: External ear normal    Left Ear: External ear normal    Mouth/Throat: Oropharynx is clear and moist    Eyes: Pupils are equal, round, and reactive to light  EOM are normal    Neck: Normal range of motion  Neck supple  Cardiovascular: Normal rate and regular rhythm  No murmur heard  Pulmonary/Chest: Effort normal and breath sounds normal  No respiratory distress  Abdominal: Soft   Bowel sounds are normal  There is tenderness (RLQ > LLQ)    Musculoskeletal: Normal range of motion  Neurological: She is alert and oriented to person, place, and time  Skin: Skin is warm  No rash noted  No pallor  Psychiatric: She has a normal mood and affect  Her behavior is normal    Nursing note and vitals reviewed  Vital Signs  ED Triage Vitals   Temperature Pulse Respirations Blood Pressure SpO2   11/20/18 2252 11/20/18 2252 11/20/18 2252 11/20/18 2252 11/20/18 2252   98 3 °F (36 8 °C) 86 18 144/79 100 %      Temp src Heart Rate Source Patient Position - Orthostatic VS BP Location FiO2 (%)   -- 11/20/18 2345 11/20/18 2345 11/20/18 2345 --    Monitor Lying Right arm       Pain Score       11/20/18 2252       8           Vitals:    11/20/18 2252 11/20/18 2345 11/21/18 0115 11/21/18 0145   BP: 144/79 124/61 104/52 106/50   Pulse: 86 72 72 74   Patient Position - Orthostatic VS:  Lying Lying Lying       Visual Acuity      ED Medications  Medications   sodium chloride 0 9 % infusion (0 mL/hr Intravenous Stopped 11/21/18 0217)   sodium chloride 0 9 % bolus 1,000 mL (0 mL Intravenous Stopped 11/21/18 0118)   ondansetron (ZOFRAN) injection 4 mg (4 mg Intravenous Given 11/20/18 2332)   morphine (PF) 4 mg/mL injection 4 mg (4 mg Intravenous Given 11/20/18 2333)   iohexol (OMNIPAQUE) 350 MG/ML injection (MULTI-DOSE) 100 mL (100 mL Intravenous Given 11/21/18 0036)   iohexol (OMNIPAQUE) 240 MG/ML solution 25 mL (25 mL Oral Given 11/21/18 0036)   morphine (PF) 4 mg/mL injection 4 mg (4 mg Intravenous Given 11/21/18 0115)   ondansetron (ZOFRAN) injection 4 mg (4 mg Intravenous Given 11/21/18 0122)       Diagnostic Studies  Results Reviewed     Procedure Component Value Units Date/Time    Lactic acid, plasma [[id number]]  (Normal) Collected:  11/21/18 0112    Lab Status:  Final result Specimen:  Blood from Arm, Left Updated:  11/21/18 0138     LACTIC ACID 0 7 mmol/L     Narrative:         Result may be elevated if tourniquet was used during collection      POCT pregnancy, urine [[id number]]  (Normal) Resulted:  11/21/18 0022    Lab Status:  Final result Updated:  11/21/18 0022     EXT PREG TEST UR (Ref: Negative) negative    POCT urinalysis dipstick [[id number]]  (Normal) Resulted:  11/21/18 0022    Lab Status:  Final result Specimen:  Urine Updated:  11/21/18 0022     Color, UA yellow    ED Urine Macroscopic [[id number]] Collected:  11/21/18 0036    Lab Status:  Final result Specimen:  Urine Updated:  11/21/18 0022     Color, UA Yellow     Clarity, UA Clear     pH, UA 7 0     Leukocytes, UA Negative     Nitrite, UA Negative     Protein, UA Negative mg/dl      Glucose, UA Negative mg/dl      Ketones, UA Negative mg/dl      Urobilinogen, UA 0 2 E U /dl      Bilirubin, UA Negative     Blood, UA Negative     Specific Gravity, UA 1 010    Narrative:       CLINITEK RESULT    Comprehensive metabolic panel [[id number]] Collected:  11/20/18 2332    Lab Status:  Final result Specimen:  Blood from Arm, Left Updated:  11/21/18 0000     Sodium 138 mmol/L      Potassium 3 8 mmol/L      Chloride 101 mmol/L      CO2 28 mmol/L      ANION GAP 9 mmol/L      BUN 7 mg/dL      Creatinine 0 67 mg/dL      Glucose 98 mg/dL      Calcium 9 2 mg/dL      AST 13 U/L      ALT 16 U/L      Alkaline Phosphatase 100 U/L      Total Protein 7 7 g/dL      Albumin 4 0 g/dL      Total Bilirubin 0 39 mg/dL      eGFR 117 ml/min/1 73sq m     Narrative:         National Kidney Disease Education Program recommendations are as follows:  GFR calculation is accurate only with a steady state creatinine  Chronic Kidney disease less than 60 ml/min/1 73 sq  meters  Kidney failure less than 15 ml/min/1 73 sq  meters      CBC and differential [[id number]]  (Abnormal) Collected:  11/20/18 2332    Lab Status:  Final result Specimen:  Blood from Arm, Left Updated:  11/20/18 2343     WBC 12 19 (H) Thousand/uL      RBC 4 32 Million/uL      Hemoglobin 13 8 g/dL      Hematocrit 41 1 %      MCV 95 fL      MCH 31 9 pg      MCHC 33 6 g/dL RDW 11 9 %      MPV 9 8 fL      Platelets 116 Thousands/uL      nRBC 0 /100 WBCs      Neutrophils Relative 77 (H) %      Immat GRANS % 0 %      Lymphocytes Relative 17 %      Monocytes Relative 5 %      Eosinophils Relative 1 %      Basophils Relative 0 %      Neutrophils Absolute 9 30 (H) Thousands/µL      Immature Grans Absolute 0 03 Thousand/uL      Lymphocytes Absolute 2 05 Thousands/µL      Monocytes Absolute 0 62 Thousand/µL      Eosinophils Absolute 0 17 Thousand/µL      Basophils Absolute 0 02 Thousands/µL                  CT abdomen pelvis w contrast   Final Result by Selena Alfaro MD (11/21 0105)      Inflammatory change around the jejunal jejunal anastomosis  Finding may represent focal enteritis or possible anastomotic site ischemia/ulceration/leak  Free fluid within the dependent pelvis  Findings discussed with Dr Sai Lan at 1:04 AM, 11/21/2018            Workstation performed: [id number]                    Procedures  Procedures       Phone Contacts  ED Phone Contact    ED Course  ED Course as of Nov 21 0307   Tue Nov 20, 2018   2254 Pt seen and examined  30 yo female with hx of gastric bypass by Dr June Winston July 2017 who presents with abdominal pain  Pt ate leftovers for lunch (no one else at them but they were both fine last night when they ate it) and 30 min later started with periumbilical pain that then radiated to lower abd  Associated nausea and loss of appetite  On exam tender in RLQ  Will give IVF, morphine, zofran and keep NPO, check labs and CT a/p - IV and po)  Wed Nov 21, 2018   0106 CT a/p - Inflammatory change around the jejunal jejunal anastomosis   Finding may represent focal enteritis or possible anastomotic site ischemia/ulceration/leak  Free fluid within the dependent pelvis  Reviewed all above with pt - nausea and pain returning - will give morphine, zofran and continued IVF  Fellow Dr Greenfield Breeding paged  [phone] Dr Gin Seay calling me back after looking at CT a/p  [address] [facility] Dr Ivan Taylor coming in to eval pt      0204 Lactate 0 7  Dr Ivan Taylor in to see pt - feels abd exam benign and pt wants to go home as she has 2 nursing exams to take later today  Will call Dr Marie Glasgow office for follow up  [facility]  CritCare Time    Disposition  Final diagnoses:   Enteritis   Nausea   Loss of appetite   S/P gastric bypass     Time reflects when diagnosis was documented in both MDM as applicable and the Disposition within this note     Time User Action Codes Description Comment    11/21/2018  2:05 AM Dorothye Halsted Add [K52 9] Enteritis     11/21/2018  2:05 AM Dewanda Co M Add [R11 0] Nausea     11/21/2018  2:05 AM Dewanda Co M Add [R63 0] Loss of appetite     11/21/2018  2:05 AM Dorothye Halsted Add [Z98 84] S/P gastric bypass       ED Disposition     ED Disposition Condition Comment    Discharge  Jez ROYAL [facility] discharge to home/self care      Condition at discharge: Good        Follow-up Information     Follow up With Specialties Details Why Meredith Ford  57 , MD General Surgery, Bariatrics Call As needed [address] [facility] [address] [facility]  [phone]            Discharge Medication List as of 11/21/2018  2:06 AM      START taking these medications    Details   ondansetron (ZOFRAN-ODT) 4 mg disintegrating tablet Take 1 tablet (4 mg total) by mouth every 8 (eight) hours as needed for nausea or vomiting for up to 7 days, Starting Wed 11/21/2018, Until Wed 11/28/2018, Print      oxyCODONE-acetaminophen (PERCOCET) 5-325 mg per tablet Take 1 tablet by mouth every 8 (eight) hours as needed for moderate pain or severe pain for up to 5 days Max Daily Amount: 3 tablets, Starting Wed 11/21/2018, Until Mon 11/26/2018, Print         CONTINUE these medications which have NOT CHANGED    Details   Calcium 500-100 MG-UNIT CHEW Chew daily  , Historical Med      DAILY MULTIPLE VITAMINS/IRON TABS Take by mouth daily  , Historical Med No discharge procedures on file      ED Provider  Electronically Signed by           Maribel Melgar DO  11/21/18 6023

## 2018-11-21 NOTE — DISCHARGE INSTRUCTIONS
Acute Nausea and Vomiting   WHAT YOU NEED TO KNOW:   Acute nausea and vomiting start suddenly, worsen quickly, and last a short time  DISCHARGE INSTRUCTIONS:   Seek care immediately if:   · You see blood in your vomit or your bowel movements  · You have sudden, severe pain in your chest and upper abdomen after hard vomiting or retching  · You have swelling in your neck and chest      · You are dizzy, cold, and thirsty and your eyes and mouth are dry  · You are urinating very little or not at all  · You have muscle weakness, leg cramps, and trouble breathing  · Your heart is beating much faster than normal      · You continue to vomit for more than 48 hours  Contact your healthcare provider if:   · You have frequent dry heaves (vomiting but nothing comes out)  · Your nausea and vomiting does not get better or go away after you use medicine  · You have questions or concerns about your condition or treatment  Medicines: You may need any of the following:  · Medicines  may be given to calm your stomach and stop your vomiting  You may also need medicines to help you feel more relaxed or to stop nausea and vomiting caused by motion sickness  · Gastrointestinal stimulants  are used to help empty your stomach and bowels  This may help decrease nausea and vomiting  · Take your medicine as directed  Contact your healthcare provider if you think your medicine is not helping or if you have side effects  Tell him or her if you are allergic to any medicine  Keep a list of the medicines, vitamins, and herbs you take  Include the amounts, and when and why you take them  Bring the list or the pill bottles to follow-up visits  Carry your medicine list with you in case of an emergency  Prevent or manage acute nausea and vomiting:   · Do not drink alcohol  Alcohol may upset or irritate your stomach  Too much alcohol can also cause acute nausea and vomiting  · Control stress    Headaches due to stress may cause nausea and vomiting  Find ways to relax and manage your stress  Get more rest and sleep  · Drink more liquids as directed  Vomiting can lead to dehydration  It is important to drink more liquids to help replace lost body fluids  Ask your healthcare provider how much liquid to drink each day and which liquids are best for you  Your provider may recommend that you drink an oral rehydration solution (ORS)  ORS contains water, salts, and sugar that are needed to replace the lost body fluids  Ask what kind of ORS to use, how much to drink, and where to get it  · Eat smaller meals, more often  Eat small amounts of food every 2 to 3 hours, even if you are not hungry  Food in your stomach may decrease your nausea  · Talk to your healthcare provider before you take over-the-counter (OTC) medicines  These medicines can cause serious problems if you use certain other medicines, or you have a medical condition  You may have problems if you use too much or use them for longer than the label says  Follow directions on the label carefully  Follow up with your healthcare provider as directed:  Write down your questions so you remember to ask them during your visits  © 2017 [facility] Information is for End User's use only and may not be sold, redistributed or otherwise used for commercial purposes  All illustrations and images included in CareNotes® are the copyrighted property of A D A M , Inc  or Jerel Medina  The above information is an  only  It is not intended as medical advice for individual conditions or treatments  Talk to your doctor, nurse or pharmacist before following any medical regimen to see if it is safe and effective for you  Enteritis   WHAT YOU NEED TO KNOW:   Enteritis is inflammation of the small intestine  It may be caused by eating foods or drinking liquids contaminated with a virus, bacteria, or parasites   It may also be caused by certain medicines, damage from radiation, and medical conditions such as Crohn disease  DISCHARGE INSTRUCTIONS:   Seek care immediately if:   · You cannot stop vomiting  · You have not urinated for 12 hours  Contact your healthcare provider if:   · You have a fever over 101 5  · You have blood or mucus in your bowel movements  · You continue to vomit or have diarrhea for more than 3 days, even after treatment  · You have a dry mouth and eyes, you are urinating less than usual, and you feel dizzy when you stand up  · Your mouth or eyes are dry  You are not urinating as much or as often  · You are losing weight without trying  · You have questions or concerns about your condition or care  Medicines:   · Medicines  may be given to fight an infection caused by bacteria or a parasite  You may also need medicines to slow or stop your diarrhea or vomiting  Do not take these medicines unless your healthcare provider say it is okay  Other medicines may be needed to treat medical conditions that are causing enteritis  · Take your medicine as directed  Contact your healthcare provider if you think your medicine is not helping or if you have side effects  Tell him of her if you are allergic to any medicine  Keep a list of the medicines, vitamins, and herbs you take  Include the amounts, and when and why you take them  Bring the list or the pill bottles to follow-up visits  Carry your medicine list with you in case of an emergency  Manage enteritis:   · Eat foods that help to decrease symptoms  Limit or avoid foods and liquids that are high in sugar, fat, and fiber to help relieve diarrhea  It may be helpful to avoid lactose  Lactose is a type of sugar that is found in milk products  You may be able to tolerate soups, broths, well-cooked vegetables, canned fruit, and baked or broiled meats  Ask your dietitian or healthcare provider if you should follow a special diet   You may need to avoid other foods if you have certain medical conditions such as celiac disease  · Drink liquids as directed  Ask how much liquid to drink each day and which liquids are best for you  It is important to prevent or treat dehydration  Even if you have been vomiting, suck on ice chips or take small sips of clear liquids often  Slowly increase the amount of clear liquids you drink  If you become dehydrated, you may need IV liquids  · Drink an oral rehydration solution (ORS) as directed  An ORS contains water, salts, and sugar that are needed to replace lost body fluids  Ask what kind of ORS to use, how much to drink, and where to get it  Prevent enteritis:  Enteritis that is caused by bacteria, parasites, or viruses can be prevented  The following may help to prevent this type of enteritis:  · Wash your hands often  Use soap and water  Wash your hands after you use the bathroom, change a child's diapers, or sneeze  Wash your hands before you prepare or eat food  · Clean surfaces and do laundry often  Wash your clothes and towels separately from the rest of the laundry  Clean surfaces in your home with antibacterial  or bleach  · Clean food thoroughly and cook safely  Wash raw vegetables before you cook  Cook meat, fish, and eggs fully  Do not use the same dishes for raw meat as you do for other foods  Refrigerate any leftover food immediately  · Be aware when you camp or travel  Drink only clean water  Do not drink from rivers or lakes unless you purify or boil the water first  When you travel, drink bottled water and do not add ice  Do not eat fruit that has not been peeled  Do not eat raw fish or meat that is not fully cooked  Follow up with your healthcare provider as directed:  Write down your questions so you remember to ask them during your visits     © 2017 [employer] Information is for End User's use only and may not be sold, redistributed or otherwise used for commercial purposes  All illustrations and images included in CareNotes® are the copyrighted property of A D A M , Inc  or Jerel Medina  The above information is an  only  It is not intended as medical advice for individual conditions or treatments  Talk to your doctor, nurse or pharmacist before following any medical regimen to see if it is safe and effective for you

## 2018-11-21 NOTE — ED NOTES
Patient unable to provide urine specimen at this time          [address], RN  11/20/18 8555

## 2018-12-17 RX ORDER — AMOXICILLIN 500 MG/1
TABLET, FILM COATED ORAL
COMMUNITY
End: 2020-01-31

## 2018-12-17 RX ORDER — IBUPROFEN 600 MG/1
TABLET ORAL
COMMUNITY
End: 2020-11-02

## 2018-12-17 RX ORDER — METHYLPREDNISOLONE 4 MG/1
TABLET ORAL
COMMUNITY
Start: 2018-10-09 | End: 2020-11-02

## 2018-12-17 RX ORDER — ACETAMINOPHEN AND CODEINE PHOSPHATE 300; 30 MG/1; MG/1
TABLET ORAL
COMMUNITY
End: 2020-11-02

## 2018-12-17 RX ORDER — SODIUM FLUORIDE 5 MG/G
GEL, DENTIFRICE DENTAL
COMMUNITY
End: 2020-11-02

## 2018-12-18 ENCOUNTER — CLINICAL SUPPORT (OUTPATIENT)
Dept: FAMILY MEDICINE CLINIC | Facility: CLINIC | Age: 33
End: 2018-12-18
Payer: COMMERCIAL

## 2018-12-18 DIAGNOSIS — Z23 NEED FOR HEPATITIS B VACCINATION: Primary | ICD-10-CM

## 2018-12-18 PROCEDURE — 90471 IMMUNIZATION ADMIN: CPT

## 2018-12-18 PROCEDURE — 90746 HEPB VACCINE 3 DOSE ADULT IM: CPT

## 2018-12-27 ENCOUNTER — OFFICE VISIT (OUTPATIENT)
Dept: FAMILY MEDICINE CLINIC | Facility: CLINIC | Age: 33
End: 2018-12-27
Payer: COMMERCIAL

## 2018-12-27 ENCOUNTER — TRANSCRIBE ORDERS (OUTPATIENT)
Dept: ADMINISTRATIVE | Facility: HOSPITAL | Age: 33
End: 2018-12-27

## 2018-12-27 VITALS
RESPIRATION RATE: 17 BRPM | BODY MASS INDEX: 28.72 KG/M2 | TEMPERATURE: 97.4 F | OXYGEN SATURATION: 99 % | WEIGHT: 157 LBS | SYSTOLIC BLOOD PRESSURE: 114 MMHG | HEART RATE: 73 BPM | DIASTOLIC BLOOD PRESSURE: 68 MMHG

## 2018-12-27 DIAGNOSIS — N64.4 BREAST PAIN, RIGHT: ICD-10-CM

## 2018-12-27 DIAGNOSIS — Z80.3 FAMILY HISTORY OF BREAST CANCER IN FEMALE: ICD-10-CM

## 2018-12-27 DIAGNOSIS — N63.10 LUMP OF RIGHT BREAST: Primary | ICD-10-CM

## 2018-12-27 PROCEDURE — 99213 OFFICE O/P EST LOW 20 MIN: CPT | Performed by: PHYSICIAN ASSISTANT

## 2018-12-27 RX ORDER — BUPROPION HYDROCHLORIDE 150 MG/1
TABLET ORAL
COMMUNITY
Start: 2018-12-16 | End: 2020-11-02

## 2018-12-27 RX ORDER — SODIUM FLUORIDE 6 MG/ML
PASTE, DENTIFRICE DENTAL
COMMUNITY
Start: 2018-11-06 | End: 2020-11-02

## 2018-12-27 NOTE — PROGRESS NOTES
Assessment/Plan:      Diagnoses and all orders for this visit:    Lump of right breast  -     Mammo screening bilateral w cad; Future  -     US breast right limited (diagnostic); Future    Breast pain, right  -     US breast right limited (diagnostic); Future    Family history of breast cancer in female  -     Mammo screening bilateral w cad; Future  -     US breast right limited (diagnostic); Future            Subjective:     Patient ID: Jessica Party is a 29 y o  female  Chief Complaint   Patient presents with    Breast Mass     Noticed lump on outter right breast  Is starting to become bothersome - where wire from bra lays      HPI  Patient is a 28 yo female with family hx of breast cancer who presents with concern for R breast lump  She appreciates the lump on R outer breast, at times it is tender to touch  No muscular injury or overuse  She denies nipple skin changes or discharge  No fever, chills  Review of Systems   Constitutional: Negative  Respiratory: Negative  Cardiovascular: Negative  Musculoskeletal: Negative  Skin: Negative for color change and rash  Lump on R outer breast   Hematological: Negative            Current Outpatient Prescriptions:     Calcium 500-100 MG-UNIT CHEW, Chew daily  , Disp: , Rfl:     cholecalciferol (VITAMIN D3) 1,000 units tablet, Vitamin D, Disp: , Rfl:     DAILY MULTIPLE VITAMINS/IRON TABS, Take by mouth daily  , Disp: , Rfl:     Multiple Vitamin (MULTI-VITAMIN DAILY PO), Multi Vitamin, Disp: , Rfl:     SODIUM FLUORIDE, DENTAL GEL, (PREVIDENT) 1 1 % GEL, PreviDent 5000 Booster Plus 1 1 % dental paste, Disp: , Rfl:     acetaminophen-codeine (TYLENOL #3) 300-30 mg per tablet, acetaminophen 300 mg-codeine 30 mg tablet, Disp: , Rfl:     amoxicillin (AMOXIL) 500 MG tablet, amoxicillin 500 mg tablet, Disp: , Rfl:     buPROPion (WELLBUTRIN XL) 150 mg 24 hr tablet, , Disp: , Rfl:     ibuprofen (MOTRIN) 600 mg tablet, ibuprofen 600 mg tablet, Disp: , Rfl:     Methylprednisolone 4 MG TBPK, , Disp: , Rfl:     ondansetron (ZOFRAN-ODT) 4 mg disintegrating tablet, Take 1 tablet (4 mg total) by mouth every 8 (eight) hours as needed for nausea or vomiting for up to 7 days, Disp: 20 tablet, Rfl: 0    PREVIDENT 5000 BOOSTER PLUS 1 1 % PSTE, , Disp: , Rfl:   Patient Active Problem List    Diagnosis Date Noted    Closed fracture of base of fifth metatarsal bone 06/15/2018    Closed fracture of neck of radius 06/15/2018    Low serum vitamin B12 02/09/2018    Bariatric surgery status 02/09/2018    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    Vitamin D deficiency     Seasonal allergies     GERD (gastroesophageal reflux disease)     Gastric band slippage 08/10/2016    Vitamin B12 deficiency 09/10/2014       Objective:  Vitals:    12/27/18 1354   BP: 114/68   Pulse: 73   Resp: 17   Temp: (!) 97 4 °F (36 3 °C)   SpO2: 99%        Physical Exam   Constitutional: She appears well-developed and well-nourished  No distress  Neck: Normal range of motion  Neck supple  Cardiovascular: Normal rate, regular rhythm, normal heart sounds and intact distal pulses  No murmur heard  Pulmonary/Chest: Effort normal and breath sounds normal  No respiratory distress  She has no wheezes  She has no rales  Right breast exhibits no inverted nipple, no nipple discharge and no skin change  Left breast exhibits no inverted nipple, no nipple discharge and no skin change  R chest wall with appreciable lump most consistent along rib cage with reproducible tenderness    Lymphadenopathy:     She has no cervical adenopathy  Skin: Skin is warm and dry  No rash noted

## 2018-12-31 ENCOUNTER — HOSPITAL ENCOUNTER (OUTPATIENT)
Dept: MAMMOGRAPHY | Facility: CLINIC | Age: 33
Discharge: HOME | End: 2018-12-31
Payer: COMMERCIAL

## 2018-12-31 ENCOUNTER — HOSPITAL ENCOUNTER (OUTPATIENT)
Dept: ULTRASOUND IMAGING | Facility: CLINIC | Age: 33
Discharge: HOME | End: 2018-12-31
Payer: COMMERCIAL

## 2018-12-31 VITALS — HEIGHT: 61 IN | BODY MASS INDEX: 28.32 KG/M2 | WEIGHT: 150 LBS

## 2018-12-31 DIAGNOSIS — N63.10 LUMP OF RIGHT BREAST: ICD-10-CM

## 2018-12-31 DIAGNOSIS — Z80.3 FAMILY HISTORY OF BREAST CANCER IN FEMALE: ICD-10-CM

## 2018-12-31 DIAGNOSIS — N64.4 BREAST PAIN, RIGHT: ICD-10-CM

## 2018-12-31 PROCEDURE — 76642 ULTRASOUND BREAST LIMITED: CPT

## 2019-09-27 ENCOUNTER — TELEPHONE (OUTPATIENT)
Dept: BARIATRICS | Facility: CLINIC | Age: 34
End: 2019-09-27

## 2019-09-27 NOTE — TELEPHONE ENCOUNTER
Lm for pt that did not show to her apt - inquiring as to whether she is running late to her apt or if she will be rescheduling

## 2019-10-23 NOTE — PROGRESS NOTES
Discussion/Summary  Discussion Summary:   Assess: 3 2# wt loss from previous visit  Net 7  3# wt gain from initial visit  No changes in Dx or Rx noted  Pt brought detailed written food journal with her  Pt is eating three meals daily, occasionally 1-2 small snacks, usually a piece of fruit of a greek yogurt  Pt quit smoking last month and will now chew gum if she has a craving  Pt has begun drinking a JBN protein drink with 8 oz 2% milk once daily for either breakfast or lunch  Pt is exercising most days per week  Diagnose: Overweight/Obesity related to positive energy balance as evidenced by BMI >30 and pt's self-reported energy intake  (Continued)  Intervene: Reviewed bariatric surgery nutrition information, pre-operative goals checklist, protein supplement suggestions, and physical activity guidelines  Reviewed workflow with pt: Pt needs nicotine test, PCP letter, cardiac risk assessment, and EGD (sched for 5 3/17)/    Pt verbalized understanding, no further questions at present, expect good compliance  Monitor/Evaluate: Will monitor food journals, weight, pt's reported compliance with goals at next appointment  Pt has follow-up with Dr Shelly Villa scheduled for 5/8/17  Active Problems    1  Blood tests prior to treatment or procedure (V72 63) (Z01 812)   2  Fatigue (780 79) (R53 83)   3  Heartburn (787 1) (R12)   4  Heat intolerance (780 99) (R68 89)   5  History of laparoscopic adjustable gastric banding (V45 86) (Z98 84)   6  Hot flashes (627 2) (N95 1)   7  Irritable bowel syndrome (564 1) (K58 9)   8  Migraine, ophthalmoplegic (346 20) (G43 B0)   9  Morbid obesity (278 01) (E66 01)   10  Tachycardia (785 0) (R00 0)   11  Vitamin B12 deficiency (266 2) (E53 8)   12  Vitamin D deficiency (268 9) (E55 9)    Past Medical History    1  History of A Fall (E888 9)   2  History of Acute frontal sinusitis (461 1) (J01 10)   3  History of Cyst of joint of ankle or foot (719 87)   4   History of Elevated blood pressure   5  History of acute bronchitis (V12 69) (Z87 09)   6  History of dysfunctional uterine bleeding (V13 29) (Z87 42)   7  History of fatigue (V13 89) (Z87 898)   8  History of Palpitations (785 1) (R00 2)   9  History of Skin Lesion    Surgical History    1  History of  Section   2  History of  Section   3  History of Cholecystectomy   4  History of Gallbladder Surgery   5  History of Hysterectomy   6  History of Hysterectomy   7  History of Laparosc Gastric Restrictive Proc By Adjustable Gastric Band   8  History of Laparosc Gastric Restrictive Proc By Adjustable Gastric Band   9  History of Laparosc Sm Intest Laser Vaporization Endometriotic Tissue    Family History  Mother    1  Family history of Atrial Fibrillation   2  Family history of Family Health Status Of Mother - Alive   3  Family history of Gallbladder Disease  Father    4  Denied: Family history of Family Health Status Father  Brother    5  Family history of Family Health Status Brother 1   6  Family history of Hypertension (V17 49)  Family History    7  Family history of Diabetes Mellitus (V18 0)   8  Family history of Diabetes Mellitus (250 00)   9  Family history of Hypertension (V17 49)    Social History    · Caffeine Use   · Current every day smoker (305 1) (F17 200)   · Living Independently With Spouse   · Marital History - Currently    · No illicit drug use   · Non drinker / no alcohol use   · Occupation:   · Passive smoke exposure (V15 89) (Z77 22)   · Sexually Active   · Two children   · Uses Safety Equipment - Seatbelts    Current Meds   1  Vitamin D (Ergocalciferol) 54993 UNIT Oral Capsule; Take 1 capsule twice weekly for 12   weeks; Therapy: [id number] to (Last Rx:2017)  Requested for: 2017 Ordered    Allergies    1   No Known Drug Allergies    Vitals  Signs   Recorded: 2017 03:34PM   Height: 5 ft 2 in  Weight: 263 lb 4 8 oz  BMI Calculated: 48 16  BSA Calculated: 2 15    Future Appointments    Date/Time Provider Specialty Site   05/08/2017 10:15 AM CRUZITO Pederson  Bariatric Medicine [facility]     Signatures   Electronically signed by : SAVI Mcdowell RD;  Apr 25 2017  3:34PM EST                       (Author)    Electronically signed by : Brookie Holstein, M D ; Apr 27 2017 12:39PM EST                       (Validation) done

## 2019-11-12 ENCOUNTER — OFFICE VISIT (OUTPATIENT)
Dept: URGENT CARE | Facility: HOSPITAL | Age: 34
End: 2019-11-12
Payer: COMMERCIAL

## 2019-11-12 VITALS
RESPIRATION RATE: 18 BRPM | HEART RATE: 84 BPM | BODY MASS INDEX: 29.63 KG/M2 | DIASTOLIC BLOOD PRESSURE: 70 MMHG | HEIGHT: 62 IN | TEMPERATURE: 97.9 F | WEIGHT: 161 LBS | SYSTOLIC BLOOD PRESSURE: 113 MMHG | OXYGEN SATURATION: 99 %

## 2019-11-12 DIAGNOSIS — H10.32 ACUTE BACTERIAL CONJUNCTIVITIS OF LEFT EYE: ICD-10-CM

## 2019-11-12 PROCEDURE — 99203 OFFICE O/P NEW LOW 30 MIN: CPT | Performed by: NURSE PRACTITIONER

## 2019-11-12 RX ORDER — TETRACAINE HYDROCHLORIDE 5 MG/ML
1 SOLUTION OPHTHALMIC ONCE
Status: COMPLETED | OUTPATIENT
Start: 2019-11-12 | End: 2019-11-12

## 2019-11-12 RX ORDER — ERYTHROMYCIN 5 MG/G
0.5 OINTMENT OPHTHALMIC EVERY 6 HOURS SCHEDULED
Qty: 20 G | Refills: 0 | Status: SHIPPED | OUTPATIENT
Start: 2019-11-12 | End: 2019-11-17

## 2019-11-12 RX ADMIN — TETRACAINE HYDROCHLORIDE 1 DROP: 5 SOLUTION OPHTHALMIC at 10:07

## 2019-11-12 NOTE — LETTER
November 12, 2019     Patient: Ana Lilia Porter   YOB: 1985   Date of Visit: 11/12/2019       To Whom it May Concern:    Yasmin Adams was seen in my clinic on 11/12/2019  She may return to school on 11/13/2019  If you have any questions or concerns, please don't hesitate to call  Sincerely,          BERNADETTE Brunson        CC: Jez Acosta

## 2019-11-12 NOTE — PATIENT INSTRUCTIONS
Use ointment as directed  Can apply cool compresses to left eye  If you develop any pain, blurred vision, visual changes, headache, dizziness, or any concerning symptoms please return proceed ER  Recommend following up with PCP in 3-5 days    Conjunctivitis   WHAT YOU SHOULD KNOW:   Conjunctivitis, or pink eye, is inflammation of your conjunctiva  The conjunctiva is a thin tissue that covers the front of your eye and the back of your eyelids  The conjunctiva helps protect your eye and keep it moist         INSTRUCTIONS:   Medicines:   · Allergy medicine: This medicine helps decrease itchy, red, swollen eyes caused by allergies  It may be given as a pill, eye drops, or nasal spray  · Antibiotics:  You will need antibiotics if your conjunctivitis is caused by bacteria  This medicine may be given as eye drops or eye ointment  · Steroid medicine: This medicine helps decrease inflammation  It may be given as a pill, eye drops, or nasal spray  · Take your medicine as directed  Call your healthcare provider if you think your medicine is not helping or if you have side effects  Tell him if you are allergic to any medicine  Keep a list of the medicines, vitamins, and herbs you take  Include the amounts, and when and why you take them  Bring the list or the pill bottles to follow-up visits  Carry your medicine list with you in case of an emergency  Follow up with your primary healthcare provider as directed: You may need to return for more tests on your eyes  These will help your primary healthcare provider check for eye damage  Write down your questions so you remember to ask them during your visits  Avoid the spread of conjunctivitis:   · Wash your hands often:  Wash your hands before you touch your eyes  Also wash your hands before you prepare or eat food and after you use the bathroom or change a diaper      · Avoid allergens:  Try to avoid the things that cause your allergies, such as pets, dust, or grass      · Avoid contact:  Do not share towels or washcloths  Try to stay away from others as much as possible  Ask when you can return to work or school  · Throw away eye makeup:  Throw away mascara and other eye makeup  Manage your symptoms:  · Apply a cool compress:  Wet a washcloth with cold water and place it on your eye  This will help decrease swelling  · Use eye drops:  Eye drops, or artificial tears, can be bought without a doctor's order  They help keep your eye moist     · Do not wear contact lenses: They can irritate your eye  Throw away the pair you are using and ask when you can wear them again  Use a new pair of lenses when your primary healthcare provider says it is okay  · Flush your eye:  You may need to flush your eye with saline to help decrease your symptoms  Ask for more information on how to flush your eye  Contact your primary healthcare provider if:   · Your eyesight becomes blurry  · You have tiny bumps or spots of blood on your eye  · You have questions or concerns about your condition or care  Return to the emergency department if:   · The swelling in your eye gets worse, even after treatment  · Your vision suddenly becomes worse or you cannot see at all  · Your eye begins to bleed  © 2014 [address] is for End User's use only and may not be sold, redistributed or otherwise used for commercial purposes  All illustrations and images included in CareNotes® are the copyrighted property of A D A M [employer]  or Jerel Medina  The above information is an  only  It is not intended as medical advice for individual conditions or treatments  Talk to your doctor, nurse or pharmacist before following any medical regimen to see if it is safe and effective for you

## 2020-01-28 ENCOUNTER — OFFICE VISIT (OUTPATIENT)
Dept: URGENT CARE | Facility: CLINIC | Age: 35
End: 2020-01-28
Payer: COMMERCIAL

## 2020-01-28 VITALS
SYSTOLIC BLOOD PRESSURE: 118 MMHG | OXYGEN SATURATION: 98 % | RESPIRATION RATE: 18 BRPM | HEIGHT: 62 IN | HEART RATE: 84 BPM | DIASTOLIC BLOOD PRESSURE: 57 MMHG | BODY MASS INDEX: 27.6 KG/M2 | WEIGHT: 150 LBS | TEMPERATURE: 97.9 F

## 2020-01-28 DIAGNOSIS — R05.9 COUGH: ICD-10-CM

## 2020-01-28 DIAGNOSIS — J02.9 SORE THROAT: ICD-10-CM

## 2020-01-28 DIAGNOSIS — J11.1 INFLUENZA: Primary | ICD-10-CM

## 2020-01-28 LAB — S PYO AG THROAT QL: NEGATIVE

## 2020-01-28 PROCEDURE — 99203 OFFICE O/P NEW LOW 30 MIN: CPT | Performed by: NURSE PRACTITIONER

## 2020-01-28 PROCEDURE — 99283 EMERGENCY DEPT VISIT LOW MDM: CPT | Performed by: NURSE PRACTITIONER

## 2020-01-28 PROCEDURE — 87070 CULTURE OTHR SPECIMN AEROBIC: CPT | Performed by: NURSE PRACTITIONER

## 2020-01-28 PROCEDURE — G0382 LEV 3 HOSP TYPE B ED VISIT: HCPCS | Performed by: NURSE PRACTITIONER

## 2020-01-28 PROCEDURE — 87147 CULTURE TYPE IMMUNOLOGIC: CPT | Performed by: NURSE PRACTITIONER

## 2020-01-28 RX ORDER — METOPROLOL TARTRATE 100 MG/1
50 TABLET ORAL EVERY 12 HOURS SCHEDULED
COMMUNITY
End: 2020-11-02

## 2020-01-28 RX ORDER — BENZONATATE 200 MG/1
200 CAPSULE ORAL 3 TIMES DAILY PRN
Qty: 20 CAPSULE | Refills: 0 | Status: SHIPPED | OUTPATIENT
Start: 2020-01-28 | End: 2020-11-02

## 2020-01-28 RX ORDER — OMEPRAZOLE 20 MG/1
20 CAPSULE, DELAYED RELEASE ORAL DAILY
COMMUNITY
End: 2020-11-02

## 2020-01-28 NOTE — PATIENT INSTRUCTIONS
Take medication as directed  Rest and drink plenty of fluids  A cool mist humidifier can be helpful  If you develop a prolonged high fever, worsening cough, chest pain, shortness of breath, palpitations, severe headache, dizziness, you  pass out, any new or concerning symptoms please return or proceed ER  Recommend follow-up with PCP in 3-5 days      Influenza   WHAT YOU NEED TO KNOW:   Influenza (the flu) is an infection caused by the influenza virus  The flu is easily spread when an infected person coughs, sneezes, or has close contact with others  You may be able to spread the flu to others for 1 week or longer after signs or symptoms appear  DISCHARGE INSTRUCTIONS:   Call 911 for any of the following:   · You have trouble breathing, and your lips look purple or blue  · You have a seizure  Return to the emergency department if:   · You are dizzy, or you are urinating less or not at all  · You have a headache with a stiff neck, and you feel tired or confused  · You have new pain or pressure in your chest     · Your symptoms, such as shortness of breath, vomiting, or diarrhea, get worse  · Your symptoms, such as fever and coughing, seem to get better, but then get worse  Contact your healthcare provider if:   · You have new muscle pain or weakness  · You have questions or concerns about your condition or care  Medicines: You may need any of the following:  · Acetaminophen  decreases pain and fever  It is available without a doctor's order  Ask how much to take and how often to take it  Follow directions  Acetaminophen can cause liver damage if not taken correctly  · NSAIDs , such as ibuprofen, help decrease swelling, pain, and fever  This medicine is available with or without a doctor's order  NSAIDs can cause stomach bleeding or kidney problems in certain people  If you take blood thinner medicine, always ask your healthcare provider if NSAIDs are safe for you   Always read the medicine label and follow directions  · Antivirals  help fight a viral infection  · Take your medicine as directed  Contact your healthcare provider if you think your medicine is not helping or if you have side effects  Tell him or her if you are allergic to any medicine  Keep a list of the medicines, vitamins, and herbs you take  Include the amounts, and when and why you take them  Bring the list or the pill bottles to follow-up visits  Carry your medicine list with you in case of an emergency  Rest  as much as you can to help you recover  Drink liquids as directed  to help prevent dehydration  Ask how much liquid to drink each day and which liquids are best for you  Prevent the spread of influenza:   · Wash your hands often  Use soap and water  Wash your hands after you use the bathroom, change a child's diapers, or sneeze  Wash your hands before you prepare or eat food  Use gel hand cleanser when soap and water are not available  Do not touch your eyes, nose, or mouth unless you have washed your hands first            · Cover your mouth when you sneeze or cough  Cough into a tissue or the bend of your arm  · Clean shared items with a germ-killing   Clean table surfaces, doorknobs, and light switches  Do not share towels, silverware, and dishes with people who are sick  Wash bed sheets, towels, silverware, and dishes with soap and water  · Wear a mask  over your mouth and nose if you are sick or are near anyone who is sick  · Stay away from others  if you are sick  · Influenza vaccine  helps prevent influenza (flu)  Everyone older than 6 months should get a yearly influenza vaccine  Get the vaccine as soon as it is available, usually in September or October each year  Follow up with your healthcare provider as directed:  Write down your questions so you remember to ask them during your visits     © 2017 [employer] Information is for End User's use only and may not be sold, redistributed or otherwise used for commercial purposes  All illustrations and images included in CareNotes® are the copyrighted property of A D [employer] M [employer] Inc  or Jerel Medina  The above information is an  only  It is not intended as medical advice for individual conditions or treatments  Talk to your doctor, nurse or pharmacist before following any medical regimen to see if it is safe and effective for you

## 2020-01-28 NOTE — LETTER
January 28, 2020     Patient: Kathi Bailey   YOB: 1985   Date of Visit: 1/28/2020       To Whom it May Concern:    Jennifer Gomez was seen in my clinic on 1/28/2020  She may return to work on 1/29/2020  If you have any questions or concerns, please don't hesitate to call           Sincerely,          BERNADETTE Freed        CC: No Recipients

## 2020-01-28 NOTE — PROGRESS NOTES
[facility]        NAME: Mark Marcial is a 36 y o  female  : 1985    MRN: [id number]  DATE: 2020  TIME: 11:55 AM    Assessment and Plan   Influenza [J11 1]  1  Influenza     2  Sore throat  POCT rapid strepA    Throat culture   3  Cough  benzonatate (TESSALON) 200 MG capsule    Throat culture         Patient Instructions     Patient Instructions        Take medication as directed  Rest and drink plenty of fluids  A cool mist humidifier can be helpful  If you develop a prolonged high fever, worsening cough, chest pain, shortness of breath, palpitations, severe headache, dizziness, you  pass out, any new or concerning symptoms please return or proceed ER  Recommend follow-up with PCP in 3-5 days      Influenza   WHAT YOU NEED TO KNOW:   Influenza (the flu) is an infection caused by the influenza virus  The flu is easily spread when an infected person coughs, sneezes, or has close contact with others  You may be able to spread the flu to others for 1 week or longer after signs or symptoms appear  DISCHARGE INSTRUCTIONS:   Call 911 for any of the following:   · You have trouble breathing, and your lips look purple or blue  · You have a seizure  Return to the emergency department if:   · You are dizzy, or you are urinating less or not at all  · You have a headache with a stiff neck, and you feel tired or confused  · You have new pain or pressure in your chest     · Your symptoms, such as shortness of breath, vomiting, or diarrhea, get worse  · Your symptoms, such as fever and coughing, seem to get better, but then get worse  Contact your healthcare provider if:   · You have new muscle pain or weakness  · You have questions or concerns about your condition or care  Medicines: You may need any of the following:  · Acetaminophen  decreases pain and fever  It is available without a doctor's order  Ask how much to take and how often to take it  Follow directions  Acetaminophen can cause liver damage if not taken correctly  · NSAIDs , such as ibuprofen, help decrease swelling, pain, and fever  This medicine is available with or without a doctor's order  NSAIDs can cause stomach bleeding or kidney problems in certain people  If you take blood thinner medicine, always ask your healthcare provider if NSAIDs are safe for you  Always read the medicine label and follow directions  · Antivirals  help fight a viral infection  · Take your medicine as directed  Contact your healthcare provider if you think your medicine is not helping or if you have side effects  Tell him or her if you are allergic to any medicine  Keep a list of the medicines, vitamins, and herbs you take  Include the amounts, and when and why you take them  Bring the list or the pill bottles to follow-up visits  Carry your medicine list with you in case of an emergency  Rest  as much as you can to help you recover  Drink liquids as directed  to help prevent dehydration  Ask how much liquid to drink each day and which liquids are best for you  Prevent the spread of influenza:   · Wash your hands often  Use soap and water  Wash your hands after you use the bathroom, change a child's diapers, or sneeze  Wash your hands before you prepare or eat food  Use gel hand cleanser when soap and water are not available  Do not touch your eyes, nose, or mouth unless you have washed your hands first            · Cover your mouth when you sneeze or cough  Cough into a tissue or the bend of your arm  · Clean shared items with a germ-killing   Clean table surfaces, doorknobs, and light switches  Do not share towels, silverware, and dishes with people who are sick  Wash bed sheets, towels, silverware, and dishes with soap and water  · Wear a mask  over your mouth and nose if you are sick or are near anyone who is sick  · Stay away from others  if you are sick       · Influenza vaccine  helps prevent influenza (flu)  Everyone older than 6 months should get a yearly influenza vaccine  Get the vaccine as soon as it is available, usually in September or October each year  Follow up with your healthcare provider as directed:  Write down your questions so you remember to ask them during your visits  © 2017 [employer] Information is for End User's use only and may not be sold, redistributed or otherwise used for commercial purposes  All illustrations and images included in CareNotes® are the copyrighted property of [employer]  or Jerel Medina  The above information is an  only  It is not intended as medical advice for individual conditions or treatments  Talk to your doctor, nurse or pharmacist before following any medical regimen to see if it is safe and effective for you  Follow up with PCP in 3-5 days  Proceed to  ER if symptoms worsen  Chief Complaint     Chief Complaint   Patient presents with   Jeannett Situ Like Symptoms     Patient c/o fever, cough, chest congestion, and ear pain 5 days         History of Present Illness       Patient is a 62-year-old female presents with a five-day history of fever, congestion, fatigue, and cough  Patient is complaining of bilateral ear pressure  T-max 101°  Patient notes the fever has since resolved  States the cough is nonproductive  Also notes mild sore throat  Denies any sinus pain or pressure, headache, dizziness, or feeling lightheaded  Notes positive sick contacts  Patient to get her flu shot this year  Denies any recent travel  Denies any chest pain, shortness of breath, hemoptysis, palpitations  Review of Systems   Review of Systems   Constitutional: Positive for fever  Negative for chills, diaphoresis and fatigue  HENT: Positive for congestion, ear pain, rhinorrhea and sore throat   Negative for ear discharge, facial swelling, mouth sores, postnasal drip, sinus pressure, sinus pain and trouble swallowing  Eyes: Negative for photophobia, pain, discharge, redness, itching and visual disturbance  Respiratory: Positive for cough  Negative for chest tightness, shortness of breath, wheezing and stridor  Cardiovascular: Negative for chest pain and palpitations  Gastrointestinal: Negative for abdominal pain, diarrhea, nausea and vomiting  Genitourinary: Negative  Musculoskeletal: Negative for arthralgias, back pain, joint swelling, myalgias, neck pain and neck stiffness  Skin: Negative for rash  Neurological: Positive for headaches  Negative for dizziness, syncope, weakness, light-headedness and numbness           Current Medications       Current Outpatient Medications:     Calcium 500-100 MG-UNIT CHEW, Chew daily  , Disp: , Rfl:     cholecalciferol (VITAMIN D3) 1,000 units tablet, Vitamin D, Disp: , Rfl:     DAILY MULTIPLE VITAMINS/IRON TABS, Take by mouth daily  , Disp: , Rfl:     metoprolol tartrate (LOPRESSOR) 100 mg tablet, Take 50 mg by mouth every 12 (twelve) hours, Disp: , Rfl:     omeprazole (PriLOSEC) 20 mg delayed release capsule, Take 20 mg by mouth daily, Disp: , Rfl:     acetaminophen-codeine (TYLENOL #3) 300-30 mg per tablet, acetaminophen 300 mg-codeine 30 mg tablet, Disp: , Rfl:     amoxicillin (AMOXIL) 500 MG tablet, amoxicillin 500 mg tablet, Disp: , Rfl:     benzonatate (TESSALON) 200 MG capsule, Take 1 capsule (200 mg total) by mouth 3 (three) times a day as needed for cough, Disp: 20 capsule, Rfl: 0    buPROPion (WELLBUTRIN XL) 150 mg 24 hr tablet, , Disp: , Rfl:     ibuprofen (MOTRIN) 600 mg tablet, ibuprofen 600 mg tablet, Disp: , Rfl:     Methylprednisolone 4 MG TBPK, , Disp: , Rfl:     Multiple Vitamin (MULTI-VITAMIN DAILY PO), Multi Vitamin, Disp: , Rfl:     ondansetron (ZOFRAN-ODT) 4 mg disintegrating tablet, Take 1 tablet (4 mg total) by mouth every 8 (eight) hours as needed for nausea or vomiting for up to 7 days (Patient not taking: Reported on 1/28/2020), Disp: 20 tablet, Rfl: 0    PREVIDENT 5000 BOOSTER PLUS 1 1 % PSTE, , Disp: , Rfl:     SODIUM FLUORIDE, DENTAL GEL, (PREVIDENT) 1 1 % GEL, PreviDent 5000 Booster Plus 1 1 % dental paste, Disp: , Rfl:     Current Allergies     Allergies as of 01/28/2020 - Reviewed 01/28/2020   Allergen Reaction Noted    Chantix [varenicline] GI Intolerance 06/28/2018            The following portions of the patient's history were reviewed and updated as appropriate: allergies, current medications, past family history, past medical history, past social history, past surgical history and problem list      Past Medical History:   Diagnosis Date    Anxiety     Cancer ([facility] [address] )     stage 1 melanoma left shoulder/ right breast    Elevated blood pressure reading     last assessed: 03/02/16    Exercises 5 to 6 times per week     not currently due to ankle injury    Fall     left ankle    Family history of reaction to anesthesia     "daughter right after oral surgery had fever 105 and was told allergy to one of the anesthesia meds"    GERD (gastroesophageal reflux disease)     Headache     History of anemia     History of bariatric surgery     lost 155lbs in 11 months    History of bone cyst     cyst of joint of ankle or foot, last assessed: 05/05/15    History of hypothyroidism     "told no longer has it"    History of pneumonia     Hypertension     Irritable bowel syndrome     Migraines     Motion sickness     Palpitations     last assessed: 03/02/16, pt reports none recent    Seasonal allergies     Sleep related teeth grinding     Tachycardia     last assessed: 04/04/17,,pt reports no longer has this      Uses brace     left ankle    Vitamin B12 deficiency     pt reports normal now    Vitamin D deficiency     most recent labs pt reports normal    Wears glasses        Past Surgical History:   Procedure Laterality Date    ARTHROSCOPY ANKLE Left 7/6/2018    Procedure: ANKLE ARTHROSCOPIC EXPLORATION; REPEAT STRESSED IMAGING;  Surgeon: Ashlee Carlos DPM;  Location: [facility];  Service: Podiatry     SECTION      CHOLECYSTECTOMY      COLONOSCOPY      ESOPHAGOGASTRODUODENOSCOPY  2010    FOOT SURGERY Left     cyst     FOOT SURGERY Right     cyst    GALLBLADDER SURGERY      HYSTERECTOMY      LAPAROSCOPIC GASTRIC BANDING  2010    [place] EGD TRANSORAL BIOPSY SINGLE/MULTIPLE N/A 5/3/2017    Procedure: ESOPHAGOGASTRODUODENOSCOPY (EGD) with bx;  Surgeon: Abbie Anthony MD;  Location: [facility]; Service: Bariatrics    [place] LAP GASTRIC BYPASS/BENJAMIN-EN-Y N/A 2017    Procedure: BYPASS GASTRIC  BENJAMIN-EN-Y LAPAROSCOPIC, intraoperative EGD;  Surgeon: Abbie Anthony MD;  Location: [facility];  Service: Bariatrics    REMOVAL GASTRIC BAND LAPAROSCOPIC  2015    SKIN BIOPSY      SKIN CANCER EXCISION      right breast and left shoulder for melanoma    WISDOM TOOTH EXTRACTION         Family History   Problem Relation Age of Onset    Atrial fibrillation Mother     Gallbladder disease Mother     No Known Problems Father     Hypertension Brother     Diabetes Family     Hypertension Family     Breast cancer Maternal Aunt     Breast cancer Maternal Aunt     Breast cancer Maternal Aunt          Medications have been verified  Objective   /57   Pulse 84   Temp 97 9 °F (36 6 °C) (Tympanic)   Resp 18   Ht 5' 2" (1 575 m)   Wt 68 kg (150 lb)   SpO2 98%   BMI 27 44 kg/m²        Physical Exam     Physical Exam   Constitutional: She is oriented to person, place, and time  She appears well-developed and well-nourished  No distress  HENT:   Head: Normocephalic and atraumatic  Right Ear: Hearing, tympanic membrane, external ear and ear canal normal    Left Ear: Hearing, tympanic membrane, external ear and ear canal normal    Nose: Rhinorrhea present  No mucosal edema  Right sinus exhibits no maxillary sinus tenderness and no frontal sinus tenderness   Left sinus exhibits no maxillary sinus tenderness and no frontal sinus tenderness  Mouth/Throat: Uvula is midline and mucous membranes are normal  Posterior oropharyngeal erythema present  No oropharyngeal exudate, posterior oropharyngeal edema or tonsillar abscesses  Tonsils are 2+ on the right  Tonsils are 2+ on the left  No tonsillar exudate  Eyes: Pupils are equal, round, and reactive to light  Conjunctivae are normal    Cardiovascular: Normal rate, regular rhythm, S1 normal, S2 normal, normal heart sounds, intact distal pulses and normal pulses  Pulmonary/Chest: Effort normal and breath sounds normal    Lymphadenopathy:     She has no cervical adenopathy  Neurological: She is alert and oriented to person, place, and time  GCS eye subscore is 4  GCS verbal subscore is 5  GCS motor subscore is 6  Skin: Skin is warm and dry  Capillary refill takes less than 2 seconds  No rash noted  She is not diaphoretic

## 2020-01-31 ENCOUNTER — TELEPHONE (OUTPATIENT)
Dept: URGENT CARE | Facility: CLINIC | Age: 35
End: 2020-01-31

## 2020-01-31 DIAGNOSIS — J02.0 STREP PHARYNGITIS: Primary | ICD-10-CM

## 2020-01-31 LAB — BACTERIA THROAT CULT: ABNORMAL

## 2020-01-31 RX ORDER — PENICILLIN V POTASSIUM 500 MG/1
500 TABLET ORAL 2 TIMES DAILY
Qty: 20 TABLET | Refills: 0 | Status: SHIPPED | OUTPATIENT
Start: 2020-01-31 | End: 2020-02-10

## 2020-01-31 NOTE — TELEPHONE ENCOUNTER
Pt called for test results, advised her she had strep, and antibiotics were called into the [facility] in [place]

## 2020-11-02 ENCOUNTER — APPOINTMENT (OUTPATIENT)
Dept: LAB | Facility: CLINIC | Age: 35
End: 2020-11-02
Payer: COMMERCIAL

## 2020-11-02 ENCOUNTER — TELEMEDICINE (OUTPATIENT)
Dept: FAMILY MEDICINE CLINIC | Facility: CLINIC | Age: 35
End: 2020-11-02
Payer: COMMERCIAL

## 2020-11-02 DIAGNOSIS — R53.83 OTHER FATIGUE: ICD-10-CM

## 2020-11-02 DIAGNOSIS — Z13.29 THYROID DISORDER SCREEN: ICD-10-CM

## 2020-11-02 DIAGNOSIS — R00.2 HEART PALPITATIONS: ICD-10-CM

## 2020-11-02 DIAGNOSIS — R00.2 HEART PALPITATIONS: Primary | ICD-10-CM

## 2020-11-02 DIAGNOSIS — Z82.49 FAMILY HISTORY OF CARDIAC PACEMAKER: ICD-10-CM

## 2020-11-02 DIAGNOSIS — F32.A ANXIETY AND DEPRESSION: ICD-10-CM

## 2020-11-02 DIAGNOSIS — Z12.4 SCREENING FOR CERVICAL CANCER: ICD-10-CM

## 2020-11-02 DIAGNOSIS — D72.829 LEUKOCYTOSIS, UNSPECIFIED TYPE: ICD-10-CM

## 2020-11-02 DIAGNOSIS — F41.9 ANXIETY AND DEPRESSION: ICD-10-CM

## 2020-11-02 DIAGNOSIS — Z13.220 LIPID SCREENING: ICD-10-CM

## 2020-11-02 PROBLEM — R00.0 TACHYCARDIA, UNSPECIFIED: Status: ACTIVE | Noted: 2020-11-02

## 2020-11-02 PROBLEM — R12 HEARTBURN: Status: ACTIVE | Noted: 2020-11-02

## 2020-11-02 PROBLEM — G43.B0 MIGRAINE, OPHTHALMOPLEGIC: Status: ACTIVE | Noted: 2020-11-02

## 2020-11-02 PROBLEM — R23.2 HOT FLASHES: Status: ACTIVE | Noted: 2020-11-02

## 2020-11-02 PROBLEM — K58.9 IRRITABLE BOWEL SYNDROME: Status: ACTIVE | Noted: 2020-11-02

## 2020-11-02 LAB
BASOPHILS # BLD AUTO: 0.02 THOUSANDS/ΜL (ref 0–0.1)
BASOPHILS NFR BLD AUTO: 0 % (ref 0–1)
CHOLEST SERPL-MCNC: 132 MG/DL (ref 50–200)
EOSINOPHIL # BLD AUTO: 0.17 THOUSAND/ΜL (ref 0–0.61)
EOSINOPHIL NFR BLD AUTO: 2 % (ref 0–6)
ERYTHROCYTE [DISTWIDTH] IN BLOOD BY AUTOMATED COUNT: 11.9 % (ref 11.6–15.1)
HCT VFR BLD AUTO: 41.3 % (ref 34.8–46.1)
HDLC SERPL-MCNC: 60 MG/DL
HGB BLD-MCNC: 13.4 G/DL (ref 11.5–15.4)
IMM GRANULOCYTES # BLD AUTO: 0.02 THOUSAND/UL (ref 0–0.2)
IMM GRANULOCYTES NFR BLD AUTO: 0 % (ref 0–2)
LDLC SERPL CALC-MCNC: 57 MG/DL (ref 0–100)
LYMPHOCYTES # BLD AUTO: 1.65 THOUSANDS/ΜL (ref 0.6–4.47)
LYMPHOCYTES NFR BLD AUTO: 24 % (ref 14–44)
MCH RBC QN AUTO: 32.1 PG (ref 26.8–34.3)
MCHC RBC AUTO-ENTMCNC: 32.4 G/DL (ref 31.4–37.4)
MCV RBC AUTO: 99 FL (ref 82–98)
MONOCYTES # BLD AUTO: 0.38 THOUSAND/ΜL (ref 0.17–1.22)
MONOCYTES NFR BLD AUTO: 6 % (ref 4–12)
NEUTROPHILS # BLD AUTO: 4.73 THOUSANDS/ΜL (ref 1.85–7.62)
NEUTS SEG NFR BLD AUTO: 68 % (ref 43–75)
NONHDLC SERPL-MCNC: 72 MG/DL
NRBC BLD AUTO-RTO: 0 /100 WBCS
PLATELET # BLD AUTO: 224 THOUSANDS/UL (ref 149–390)
PMV BLD AUTO: 11 FL (ref 8.9–12.7)
RBC # BLD AUTO: 4.17 MILLION/UL (ref 3.81–5.12)
TRIGL SERPL-MCNC: 73 MG/DL
TSH SERPL DL<=0.05 MIU/L-ACNC: 0.91 UIU/ML (ref 0.36–3.74)
WBC # BLD AUTO: 6.97 THOUSAND/UL (ref 4.31–10.16)

## 2020-11-02 PROCEDURE — 84443 ASSAY THYROID STIM HORMONE: CPT

## 2020-11-02 PROCEDURE — 80061 LIPID PANEL: CPT

## 2020-11-02 PROCEDURE — 3725F SCREEN DEPRESSION PERFORMED: CPT | Performed by: PHYSICIAN ASSISTANT

## 2020-11-02 PROCEDURE — 36415 COLL VENOUS BLD VENIPUNCTURE: CPT

## 2020-11-02 PROCEDURE — 99214 OFFICE O/P EST MOD 30 MIN: CPT | Performed by: PHYSICIAN ASSISTANT

## 2020-11-02 PROCEDURE — 4004F PT TOBACCO SCREEN RCVD TLK: CPT | Performed by: PHYSICIAN ASSISTANT

## 2020-11-02 PROCEDURE — 85025 COMPLETE CBC W/AUTO DIFF WBC: CPT

## 2020-11-02 RX ORDER — ESCITALOPRAM OXALATE 5 MG/1
5 TABLET ORAL DAILY
Qty: 30 TABLET | Refills: 2 | Status: SHIPPED | OUTPATIENT
Start: 2020-11-02 | End: 2020-12-04 | Stop reason: SDUPTHER

## 2020-11-09 ENCOUNTER — HOSPITAL ENCOUNTER (OUTPATIENT)
Dept: NON INVASIVE DIAGNOSTICS | Facility: HOSPITAL | Age: 35
Discharge: HOME | End: 2020-11-09
Payer: COMMERCIAL

## 2020-11-09 DIAGNOSIS — R00.2 HEART PALPITATIONS: ICD-10-CM

## 2020-11-09 DIAGNOSIS — R53.83 OTHER FATIGUE: ICD-10-CM

## 2020-11-09 DIAGNOSIS — Z82.49 FAMILY HISTORY OF CARDIAC PACEMAKER: ICD-10-CM

## 2020-11-09 PROCEDURE — 93306 TTE W/DOPPLER COMPLETE: CPT | Performed by: INTERNAL MEDICINE

## 2020-11-09 PROCEDURE — 93226 XTRNL ECG REC<48 HR SCAN A/R: CPT

## 2020-11-09 PROCEDURE — 93225 XTRNL ECG REC<48 HRS REC: CPT

## 2020-11-09 PROCEDURE — 93306 TTE W/DOPPLER COMPLETE: CPT

## 2020-11-11 PROCEDURE — 93227 XTRNL ECG REC<48 HR R&I: CPT | Performed by: INTERNAL MEDICINE

## 2020-12-02 ENCOUNTER — TELEPHONE (OUTPATIENT)
Dept: BARIATRICS | Facility: CLINIC | Age: 35
End: 2020-12-02

## 2020-12-04 ENCOUNTER — OFFICE VISIT (OUTPATIENT)
Dept: FAMILY MEDICINE CLINIC | Facility: CLINIC | Age: 35
End: 2020-12-04
Payer: COMMERCIAL

## 2020-12-04 VITALS
DIASTOLIC BLOOD PRESSURE: 78 MMHG | OXYGEN SATURATION: 97 % | TEMPERATURE: 97.1 F | WEIGHT: 167.4 LBS | BODY MASS INDEX: 30.8 KG/M2 | HEART RATE: 83 BPM | HEIGHT: 62 IN | SYSTOLIC BLOOD PRESSURE: 120 MMHG

## 2020-12-04 DIAGNOSIS — Z23 ENCOUNTER FOR IMMUNIZATION: ICD-10-CM

## 2020-12-04 DIAGNOSIS — F41.9 ANXIETY AND DEPRESSION: ICD-10-CM

## 2020-12-04 DIAGNOSIS — Z13.31 NEGATIVE DEPRESSION SCREENING: ICD-10-CM

## 2020-12-04 DIAGNOSIS — Z12.4 SCREENING FOR CERVICAL CANCER: ICD-10-CM

## 2020-12-04 DIAGNOSIS — F32.A ANXIETY AND DEPRESSION: ICD-10-CM

## 2020-12-04 DIAGNOSIS — Z00.00 ANNUAL PHYSICAL EXAM: Primary | ICD-10-CM

## 2020-12-04 PROCEDURE — 3725F SCREEN DEPRESSION PERFORMED: CPT | Performed by: PHYSICIAN ASSISTANT

## 2020-12-04 PROCEDURE — 3008F BODY MASS INDEX DOCD: CPT | Performed by: PHYSICIAN ASSISTANT

## 2020-12-04 PROCEDURE — 90471 IMMUNIZATION ADMIN: CPT

## 2020-12-04 PROCEDURE — 4004F PT TOBACCO SCREEN RCVD TLK: CPT | Performed by: PHYSICIAN ASSISTANT

## 2020-12-04 PROCEDURE — 99395 PREV VISIT EST AGE 18-39: CPT | Performed by: PHYSICIAN ASSISTANT

## 2020-12-04 PROCEDURE — 90686 IIV4 VACC NO PRSV 0.5 ML IM: CPT

## 2020-12-04 RX ORDER — ESCITALOPRAM OXALATE 10 MG/1
10 TABLET ORAL DAILY
Qty: 90 TABLET | Refills: 1 | Status: SHIPPED | OUTPATIENT
Start: 2020-12-04 | End: 2021-01-18 | Stop reason: SDUPTHER

## 2020-12-04 RX ORDER — ESCITALOPRAM OXALATE 10 MG/1
10 TABLET ORAL DAILY
Qty: 30 TABLET | Refills: 2 | Status: SHIPPED | OUTPATIENT
Start: 2020-12-04 | End: 2020-12-04 | Stop reason: SDUPTHER

## 2021-01-18 DIAGNOSIS — F32.A ANXIETY AND DEPRESSION: ICD-10-CM

## 2021-01-18 DIAGNOSIS — F41.9 ANXIETY AND DEPRESSION: ICD-10-CM

## 2021-01-18 RX ORDER — ESCITALOPRAM OXALATE 10 MG/1
10 TABLET ORAL DAILY
Qty: 90 TABLET | Refills: 1 | Status: SHIPPED | OUTPATIENT
Start: 2021-01-18 | End: 2021-07-14

## 2021-01-18 NOTE — TELEPHONE ENCOUNTER
Patient had an issue with her insurance and was not able to  the Lexapro that was ordered in December  Patient now has to use a different pharmacy  Patient is asking for the prescription to be sent to [facility] in [address] [place] [address]

## 2021-03-24 NOTE — PROGRESS NOTES
Assessment/Plan:    Bariatric surgery status  She had history of gastric banding which was removed by an outside institution-s/p Fe-En-Y Gastric Bypass with Dr Ade Arguello on 7/25/2017  Overall doing Well  Initial: 256 lb  Current: 156 lb  EWL: 83% which is above average  Chris: Current  Current BMI is Body mass index is 28 53 kg/m²  Tolerating a regular diet-yes  Eating at least 60 grams of protein per day-yes  Following 30/60 minute rule with liquids-yes  Drinking at least 64 ounces of fluid per day-yes  Drinking carbonated beverages-no  Sufficient exercise-walking /injured ankle-advised exercise if/as tolerated  Using NSAIDs regularly-no  Using nicotine-no  Using alcohol-no      Low serum vitamin B12  Reminded her to add an extra 500 mcg sublingual vitamin B12 daily    Vitamin D deficiency  Add an EXTRA 2000 IU vitamin D3 daily    GERD (gastroesophageal reflux disease)  She has had this controlled on ppi/advised on gradual taper to off  She notes she is off xarelto now  Postgastrectomy malabsorption  Malabsorption- patient is at risk for malabsorption of vitamins/minerals secondary to malabsorption from her procedure and restriction of intakes  Reviewed current supplements and advised on same    She is taking 4 bariatric fusion and one calcium  She did not add extra vitamin D-and had temporarily added extra vitamin B12 but did not continue  -advised on same  She has not gotten her routine labs yet  Advised to add those supplements in and get labs next month  Diagnoses and all orders for this visit:    Postsurgical malabsorption  -     Copper Level; Future  -     Zinc; Future    Bariatric surgery status  -     Copper Level; Future  -     Zinc; Future    Low serum vitamin B12    Vitamin D deficiency    Gastroesophageal reflux disease without esophagitis    Postgastrectomy malabsorption          Subjective:      Patient ID: Brian Guerrero is a 33 y o  female  She is here in routine follow-up   She is Patient's port accessed without incident. Blood return noted. Tubes drawn, labeled and sent to lab. Port flushed with saline and heparin. Patient tolerated lab draw well.     Ifeoma Barron RN     happy with her weight loss  Going to nursing school now  She had injured her ankle but is now walking for exercise  She is taking bariatric supplements and has no complaints today        The following portions of the patient's history were reviewed and updated as appropriate: allergies, current medications, past family history, past medical history, past social history, past surgical history and problem list     Review of Systems   Constitutional: Negative for chills and fever  Unexpected weight change: planned weight loss  Respiratory: Negative for shortness of breath and wheezing  Cardiovascular: Negative for chest pain and palpitations  Gastrointestinal: Negative for abdominal pain, constipation, diarrhea, nausea and vomiting  Psychiatric/Behavioral: Suicidal ideas: no complait of anxiety or depression  Objective:      /70   Pulse 82   Temp 98 2 °F (36 8 °C)   Resp 18   Ht 5' 2" (1 575 m)   Wt 70 8 kg (156 lb)   BMI 28 53 kg/m²          Physical Exam   Constitutional: She is oriented to person, place, and time  She appears well-developed and well-nourished  HENT:   Mouth/Throat: Oropharynx is clear and moist    Eyes: Conjunctivae are normal  No scleral icterus  Cardiovascular: Normal rate, regular rhythm and normal heart sounds  Pulmonary/Chest: Effort normal and breath sounds normal    Abdominal: Soft  There is no tenderness  No incisional hernias appreciated   Musculoskeletal:   Normal gait   Neurological: She is alert and oriented to person, place, and time  Psychiatric: She has a normal mood and affect  Her behavior is normal  Judgment and thought content normal    Nursing note and vitals reviewed        Maintain weight loss+/- few pound weight loss with good nutrition intakes  Normal vitamin and mineral levels  Exercise as tolerated

## 2021-07-13 DIAGNOSIS — F32.A ANXIETY AND DEPRESSION: ICD-10-CM

## 2021-07-13 DIAGNOSIS — F41.9 ANXIETY AND DEPRESSION: ICD-10-CM

## 2021-07-14 RX ORDER — ESCITALOPRAM OXALATE 10 MG/1
TABLET ORAL
Qty: 90 TABLET | Refills: 1 | Status: SHIPPED | OUTPATIENT
Start: 2021-07-14

## 2022-04-20 ENCOUNTER — APPOINTMENT (OUTPATIENT)
Dept: URGENT CARE | Facility: CLINIC | Age: 37
End: 2022-04-20

## 2022-04-20 ENCOUNTER — APPOINTMENT (OUTPATIENT)
Dept: LAB | Facility: CLINIC | Age: 37
End: 2022-04-20

## 2022-04-20 DIAGNOSIS — Z02.1 PRE-EMPLOYMENT EXAMINATION: Primary | ICD-10-CM

## 2022-04-20 DIAGNOSIS — Z02.1 PRE-EMPLOYMENT EXAMINATION: ICD-10-CM

## 2022-04-20 LAB — RUBV IGG SERPL IA-ACNC: 64.5 IU/ML

## 2022-04-20 PROCEDURE — 86762 RUBELLA ANTIBODY: CPT

## 2022-04-20 PROCEDURE — 36415 COLL VENOUS BLD VENIPUNCTURE: CPT

## 2022-04-20 PROCEDURE — 86735 MUMPS ANTIBODY: CPT

## 2022-04-20 PROCEDURE — 86787 VARICELLA-ZOSTER ANTIBODY: CPT

## 2022-04-20 PROCEDURE — 86480 TB TEST CELL IMMUN MEASURE: CPT

## 2022-04-20 PROCEDURE — 86765 RUBEOLA ANTIBODY: CPT

## 2022-04-21 LAB
GAMMA INTERFERON BACKGROUND BLD IA-ACNC: 0.03 IU/ML
M TB IFN-G BLD-IMP: NEGATIVE
M TB IFN-G CD4+ BCKGRND COR BLD-ACNC: -0.01 IU/ML
M TB IFN-G CD4+ BCKGRND COR BLD-ACNC: -0.01 IU/ML
MEV IGG SER QL: NORMAL
MITOGEN IGNF BCKGRD COR BLD-ACNC: >10 IU/ML
MUV IGG SER QL: ABNORMAL
VZV IGG SER IA-ACNC: NORMAL

## 2024-10-08 ENCOUNTER — OFFICE VISIT (OUTPATIENT)
Dept: FAMILY MEDICINE CLINIC | Facility: CLINIC | Age: 39
End: 2024-10-08
Payer: COMMERCIAL

## 2024-10-08 VITALS
TEMPERATURE: 97.8 F | BODY MASS INDEX: 47.13 KG/M2 | WEIGHT: 266 LBS | DIASTOLIC BLOOD PRESSURE: 80 MMHG | RESPIRATION RATE: 16 BRPM | SYSTOLIC BLOOD PRESSURE: 122 MMHG | HEART RATE: 73 BPM | OXYGEN SATURATION: 100 % | HEIGHT: 63 IN

## 2024-10-08 DIAGNOSIS — Z13.220 SCREENING, LIPID: ICD-10-CM

## 2024-10-08 DIAGNOSIS — F32.A ANXIETY AND DEPRESSION: ICD-10-CM

## 2024-10-08 DIAGNOSIS — Z00.00 ANNUAL PHYSICAL EXAM: Primary | ICD-10-CM

## 2024-10-08 DIAGNOSIS — Z13.1 SCREENING FOR DIABETES MELLITUS: ICD-10-CM

## 2024-10-08 DIAGNOSIS — Z13.0 SCREENING FOR BLOOD DISEASE: ICD-10-CM

## 2024-10-08 DIAGNOSIS — Z13.228 SCREENING FOR METABOLIC DISORDER: ICD-10-CM

## 2024-10-08 DIAGNOSIS — F41.9 ANXIETY AND DEPRESSION: ICD-10-CM

## 2024-10-08 DIAGNOSIS — Z13.29 SCREENING FOR THYROID DISORDER: ICD-10-CM

## 2024-10-08 DIAGNOSIS — G47.9 SLEEP DISTURBANCE: ICD-10-CM

## 2024-10-08 PROCEDURE — 99203 OFFICE O/P NEW LOW 30 MIN: CPT | Performed by: STUDENT IN AN ORGANIZED HEALTH CARE EDUCATION/TRAINING PROGRAM

## 2024-10-08 PROCEDURE — 99385 PREV VISIT NEW AGE 18-39: CPT | Performed by: STUDENT IN AN ORGANIZED HEALTH CARE EDUCATION/TRAINING PROGRAM

## 2024-10-08 RX ORDER — ESCITALOPRAM OXALATE 10 MG/1
10 TABLET ORAL DAILY
Qty: 30 TABLET | Refills: 2 | Status: SHIPPED | OUTPATIENT
Start: 2024-10-08 | End: 2025-01-06

## 2024-10-08 NOTE — PROGRESS NOTES
Adult Annual Physical  Name: Jez Acosta      : 1985      MRN: [id number]  Encounter Provider: Jeny Ulrich MD  Encounter Date: 10/8/2024   Encounter department: [facility]    Assessment & Plan  Sleep disturbance  Referral to sleep medicine placed for further eval  Orders:    Ambulatory Referral to Sleep Medicine; Future    Annual physical exam    Orders:    CBC and differential; Future    Comprehensive metabolic panel; Future    Hemoglobin A1C; Future    Lipid Panel with Direct LDL reflex; Future    Vitamin D 25 hydroxy; Future    TSH, 3rd generation with Free T4 reflex; Future    Screening for blood disease    Orders:    CBC and differential; Future    Screening for metabolic disorder    Orders:    Comprehensive metabolic panel; Future    Screening for thyroid disorder    Orders:    TSH, 3rd generation with Free T4 reflex; Future    Screening for diabetes mellitus    Orders:    Hemoglobin A1C; Future    Screening, lipid    Orders:    Lipid Panel with Direct LDL reflex; Future    Anxiety and depression  Depression Screening Follow-up Plan: Patient's depression screening was positive with a PHQ-2 score of 6. Their PHQ-9 score was 24. Patient with underlying depression and was advised to continue current medications as prescribed. Patient assessed for underlying major depression. They have no active suicidal ideations. Brief counseling provided and recommend additional follow-up/re-evaluation next office visit.    Will restart pt on lexapro 10 mg and f/u in 4 weeks for med check  Denies SI/HI    Orders:    escitalopram (LEXAPRO) 10 mg tablet; Take 1 tablet (10 mg total) by mouth daily    Immunizations and preventive care screenings were discussed with patient today. Appropriate education was printed on patient's after visit summary.    Counseling:  Exercise: the importance of regular exercise/physical activity was discussed. Recommend exercise 3-5 times per week  "for at least 30 minutes.          History of Present Illness     Adult Annual Physical:  Patient presents for annual physical. Patient presents today for annual physical exam.  Was previously on Lexapro which helped and would like to restart for her mood. Denies si/hi.   Also reports sleep disturbance, has not been evaluated for sleep apnea. Wakes up feeling unrefreshed  .     Depression Screening:  - PHQ-2 Score: 6  - PHQ-9 Score: 24    Review of Systems   Constitutional:  Negative for appetite change and fatigue.   HENT:  Negative for congestion and sore throat.    Respiratory:  Negative for chest tightness and shortness of breath.    Cardiovascular:  Negative for chest pain.   Genitourinary:  Negative for dysuria.   Neurological:  Negative for dizziness and headaches.   Psychiatric/Behavioral:  Positive for sleep disturbance. Negative for self-injury and suicidal ideas.      Past Medical History   Past Medical History:   Diagnosis Date    Anemia     Anxiety     Cancer (HCC)     stage 1 melanoma left shoulder/ right breast    Depression     Elevated blood pressure reading     last assessed: 03/02/16    Exercises 5 to 6 times per week     not currently due to ankle injury    Fall     left ankle    Family history of reaction to anesthesia     \"daughter right after oral surgery had fever 105 and was told allergy to one of the anesthesia meds\"    GERD (gastroesophageal reflux disease)     Headache     Headache(784.0)     History of anemia     History of bariatric surgery     lost 155lbs in 11 months    History of bone cyst     cyst of joint of ankle or foot, last assessed: 05/05/15    History of hypothyroidism     \"told no longer has it\"    History of pneumonia     Hypertension     Irritable bowel syndrome     Migraines     Motion sickness     Obesity     Palpitations     last assessed: 03/02/16, pt reports none recent    Pneumonia     Seasonal allergies     Sleep related teeth grinding     Tachycardia     last " assessed: 17,,pt reports no longer has this.    Uses brace     left ankle    Vitamin B12 deficiency     pt reports normal now    Vitamin D deficiency     most recent labs pt reports normal    Wears glasses      Past Surgical History:   Procedure Laterality Date    ARTHROSCOPY ANKLE Left 2018    Procedure: ANKLE ARTHROSCOPIC EXPLORATION; REPEAT STRESSED IMAGING;  Surgeon: Virgil Tate DPM;  Location: [facility];  Service: Podiatry     SECTION      CHOLECYSTECTOMY      COLONOSCOPY      ESOPHAGOGASTRODUODENOSCOPY      FOOT SURGERY Left     cyst     FOOT SURGERY Right     cyst    FRACTURE SURGERY      GALLBLADDER SURGERY      HYSTERECTOMY      LAPAROSCOPIC GASTRIC BANDING      NC EGD TRANSORAL BIOPSY SINGLE/MULTIPLE N/A 2017    Procedure: ESOPHAGOGASTRODUODENOSCOPY (EGD) with bx;  Surgeon: Kishore Jacinto MD;  Location: [facility];  Service: Bariatrics    NC LAPS GSTR RSTCV PX W/BYP BENJAMIN-EN-Y LIMB <150 CM N/A 2017    Procedure: BYPASS GASTRIC  BENJAMIN-EN-Y LAPAROSCOPIC, intraoperative EGD;  Surgeon: Kishore Jacinto MD;  Location: [facility];  Service: Bariatrics    REMOVAL GASTRIC BAND LAPAROSCOPIC      SKIN BIOPSY      SKIN CANCER EXCISION      right breast and left shoulder for melanoma    WISDOM TOOTH EXTRACTION       Family History   Problem Relation Age of Onset    Atrial fibrillation Mother     Gallbladder disease Mother     No Known Problems Father     Hypertension Brother     Diabetes Family     Hypertension Family     Breast cancer Maternal Aunt     Breast cancer Maternal Aunt     Breast cancer Maternal Aunt      Current Outpatient Medications on File Prior to Visit   Medication Sig Dispense Refill    Multiple Vitamin (MULTI-VITAMIN DAILY PO) Multi Vitamin      cholecalciferol (VITAMIN D3) 1,000 units tablet Vitamin D (Patient not taking: Reported on 10/8/2024)       No current facility-administered medications on file prior to visit.     Allergies   Allergen  "Reactions    Chantix [Varenicline] GI Intolerance      Current Outpatient Medications on File Prior to Visit   Medication Sig Dispense Refill    Multiple Vitamin (MULTI-VITAMIN DAILY PO) Multi Vitamin      cholecalciferol (VITAMIN D3) 1,000 units tablet Vitamin D (Patient not taking: Reported on 10/8/2024)       No current facility-administered medications on file prior to visit.      Social History     Tobacco Use    Smoking status: Every Day     Current packs/day: 0.50     Average packs/day: 0.5 packs/day for 12.0 years (6.0 ttl pk-yrs)     Types: Cigarettes    Smokeless tobacco: Never    Tobacco comments:     has just recently restarted/plans to try quit again   Vaping Use    Vaping status: Never Used   Substance and Sexual Activity    Alcohol use: No    Drug use: Never    Sexual activity: Yes     Partners: Male     Birth control/protection: None       Objective     /80   Pulse 73   Temp 97.8 °F (36.6 °C)   Resp 16   Ht 5' 2.5\" (1.588 m)   Wt 121 kg (266 lb)   SpO2 100%   BMI 47.88 kg/m²     Physical Exam  Constitutional:       Appearance: She is obese.   HENT:      Head: Normocephalic and atraumatic.      Right Ear: Tympanic membrane and ear canal normal.      Left Ear: Tympanic membrane and ear canal normal.      Nose: Nose normal.      Mouth/Throat:      Mouth: Mucous membranes are moist.      Pharynx: Oropharynx is clear.   Eyes:      Extraocular Movements: Extraocular movements intact.   Cardiovascular:      Rate and Rhythm: Normal rate and regular rhythm.      Pulses: Normal pulses.      Heart sounds: Normal heart sounds.   Pulmonary:      Effort: Pulmonary effort is normal.   Abdominal:      Palpations: Abdomen is soft.      Tenderness: There is no abdominal tenderness.   Musculoskeletal:      Right lower leg: No edema.      Left lower leg: No edema.   Neurological:      Mental Status: She is alert.   Psychiatric:         Mood and Affect: Mood normal.         Thought Content: Thought content " normal.         Judgment: Judgment normal.       Administrative Statements   I have spent a total time of 30 minutes in caring for this patient on the day of the visit/encounter including Risks and benefits of tx options, Instructions for management, Importance of tx compliance, Risk factor reductions, Documenting in the medical record, and Reviewing / ordering tests, medicine, procedures  .

## 2024-10-14 ENCOUNTER — TELEPHONE (OUTPATIENT)
Dept: FAMILY MEDICINE CLINIC | Facility: CLINIC | Age: 39
End: 2024-10-14

## 2024-10-14 NOTE — TELEPHONE ENCOUNTER
PT had an apt with Dr. Ulrich on 10/8/2024.  She gave us her dental card when checking in.  When we ran her insurance and came back with my direct blue [facility] EPO which we do not accept.  I did call insurance and let her know she does not have [employer] and she is out of network with this insurance.

## 2024-10-15 ENCOUNTER — TELEPHONE (OUTPATIENT)
Dept: SLEEP CENTER | Facility: CLINIC | Age: 39
End: 2024-10-15

## 2024-10-15 NOTE — TELEPHONE ENCOUNTER
"10/15 LM FOR PT - [facility] DOES NOT PAR W/ [employer] MY DIRECT BLUE [place] \"EPO\" PLAN. CAN STILL BE SEEN BUT MAY RECEIVE BILL. ADVISED HER TO CALL INSUR COMP TO INQUIRE   "

## 2024-10-30 DIAGNOSIS — F32.A ANXIETY AND DEPRESSION: ICD-10-CM

## 2024-10-30 DIAGNOSIS — F41.9 ANXIETY AND DEPRESSION: ICD-10-CM

## 2024-10-31 RX ORDER — ESCITALOPRAM OXALATE 10 MG/1
10 TABLET ORAL DAILY
Qty: 90 TABLET | Refills: 1 | Status: SHIPPED | OUTPATIENT
Start: 2024-10-31

## 2025-07-26 DIAGNOSIS — F32.A ANXIETY AND DEPRESSION: ICD-10-CM

## 2025-07-26 DIAGNOSIS — F41.9 ANXIETY AND DEPRESSION: ICD-10-CM

## 2025-07-30 RX ORDER — ESCITALOPRAM OXALATE 10 MG/1
10 TABLET ORAL DAILY
Qty: 90 TABLET | Refills: 1 | Status: SHIPPED | OUTPATIENT
Start: 2025-07-30

## (undated) DEVICE — PACK UNIVERSAL ARTHRSCOPY PBDS

## (undated) DEVICE — 10FR FRAZIER SUCTION HANDLE: Brand: CARDINAL HEALTH

## (undated) DEVICE — ENDOPATH XCEL BLADELESS TROCARS WITH STABILITY SLEEVES: Brand: ENDOPATH XCEL

## (undated) DEVICE — PADDING CAST 4 IN  COTTON STRL

## (undated) DEVICE — CHLORAPREP HI-LITE 26ML ORANGE

## (undated) DEVICE — TUBING SMOKE EVAC W/FILTRATION DEVICE PLUMEPORT ACTIV

## (undated) DEVICE — GLOVE SRG BIOGEL ORTHOPEDIC 7.5

## (undated) DEVICE — SUT VICRYL 3-0 SH 27 IN J416H

## (undated) DEVICE — ALLENTOWN LAP CHOLE APP PACK: Brand: CARDINAL HEALTH

## (undated) DEVICE — TUBING SUCTION 5MM X 12 FT

## (undated) DEVICE — PENCIL ELECTROSURG E-Z CLEAN -0035H

## (undated) DEVICE — WEBRIL 6 IN UNSTERILE

## (undated) DEVICE — TIBURON LAPAROSCOPIC ABDOMINAL DRAPE: Brand: CONVERTORS

## (undated) DEVICE — SUT MONOCRYL 4-0 PS-2 27 IN Y426H

## (undated) DEVICE — VIOLET BRAIDED (POLYGLACTIN 910), SYNTHETIC ABSORBABLE SUTURE: Brand: COATED VICRYL

## (undated) DEVICE — OCCLUSIVE GAUZE STRIP,3% BISMUTH TRIBROMOPHENATE IN PETROLATUM BLEND: Brand: XEROFORM

## (undated) DEVICE — SUT ETHILON 4-0 PS-2 18 IN 1667H

## (undated) DEVICE — STAPLER GIA HANDLE STAND 4MM

## (undated) DEVICE — ENDO STITCH 2-0 VICRYL

## (undated) DEVICE — DRAPE C-ARMOUR

## (undated) DEVICE — SYRINGE 30ML LL

## (undated) DEVICE — GAUZE SPONGES,16 PLY: Brand: CURITY

## (undated) DEVICE — URETERAL CATHETER ADAPTOR TIP

## (undated) DEVICE — IRRIG ENDO FLO TUBING

## (undated) DEVICE — BLUE HEAT SCOPE WARMER

## (undated) DEVICE — 3000CC GUARDIAN II: Brand: GUARDIAN

## (undated) DEVICE — NEEDLE 25G X 1 1/2

## (undated) DEVICE — SCD SEQUENTIAL COMPRESSION COMFORT SLEEVE MEDIUM KNEE LENGTH: Brand: KENDALL SCD

## (undated) DEVICE — ACE WRAP 6 IN UNSTERILE

## (undated) DEVICE — AEM CORD

## (undated) DEVICE — STOCKINETTE REGULAR

## (undated) DEVICE — 10 MM BABCOCKS WITH RATCHET HANDLES: Brand: ENDOPATH

## (undated) DEVICE — SINGLE-USE BIOPSY FORCEPS: Brand: RADIAL JAW 4

## (undated) DEVICE — 3M™ STERI-STRIP™ REINFORCED ADHESIVE SKIN CLOSURES, R1547, 1/2 IN X 4 IN (12 MM X 100 MM), 6 STRIPS/ENVELOPE: Brand: 3M™ STERI-STRIP™

## (undated) DEVICE — DRAPE C-ARM X-RAY

## (undated) DEVICE — SURGICAL GOWN, XL SMARTSLEEVE: Brand: CONVERTORS

## (undated) DEVICE — PMI DISPOSABLE PUNCTURE CLOSURE DEVICE / SUTURE GRASPER: Brand: PMI

## (undated) DEVICE — PAD CAST 4 IN COTTON NON STERILE

## (undated) DEVICE — REM POLYHESIVE ADULT PATIENT RETURN ELECTRODE: Brand: VALLEYLAB

## (undated) DEVICE — ENDO STITCH 2-0 SURGIDAC 48 IN

## (undated) DEVICE — SPLINT ORTHO-GLASS 4IN X 15FT

## (undated) DEVICE — GLOVE SRG BIOGEL 8

## (undated) DEVICE — SYRINGE 10ML LL

## (undated) DEVICE — STAPLER EEA 25 MM COVIDIEN

## (undated) DEVICE — 2000CC GUARDIAN II: Brand: GUARDIAN

## (undated) DEVICE — COVIDIEN ENDO GIA PURPLE (MED) RELOAD 60MM

## (undated) DEVICE — LAP AEM SCISSOR TIP .75 IN

## (undated) DEVICE — PREP PAD BNS: Brand: CONVERTORS

## (undated) DEVICE — TUBING ARTHROSCOPIC WAVE  MAIN PUMP

## (undated) DEVICE — GLOVE SRG BIOGEL 7.5

## (undated) DEVICE — STRETCH BANDAGE: Brand: CURITY

## (undated) DEVICE — SYRINGE 3ML LL

## (undated) DEVICE — ADHESIVE SKN CLSR HISTOACRYL FLEX 0.5ML LF

## (undated) DEVICE — ACE WRAP 4 IN UNSTERILE

## (undated) DEVICE — INTENDED FOR TISSUE SEPARATION, AND OTHER PROCEDURES THAT REQUIRE A SHARP SURGICAL BLADE TO PUNCTURE OR CUT.: Brand: BARD-PARKER SAFETY BLADES SIZE 15, STERILE

## (undated) DEVICE — KERLIX BANDAGE ROLL: Brand: KERLIX

## (undated) DEVICE — BETHLEHEM UNIVERSAL  MIONR EXT: Brand: CARDINAL HEALTH

## (undated) DEVICE — GUHL ANKLE DISTRACTOR FOOT STRAPS,                                    STERILE, LATEX FREE BOX OF 6

## (undated) DEVICE — ARTHROSCOPY FLOOR MAT

## (undated) DEVICE — Device

## (undated) DEVICE — SEAMGUARD STPL REINF ENDO GIA ULTRA UNIV 60 PURPLE

## (undated) DEVICE — CAST PADDING 4 IN SYNTHETIC NON-STRL

## (undated) DEVICE — NEEDLE HYPO 22G X 1-1/2 IN

## (undated) DEVICE — TROCAR VISIPORT

## (undated) DEVICE — ENDOPOUCH RETRIEVER SPECIMEN RETRIEVAL BAGS: Brand: ENDOPOUCH RETRIEVER

## (undated) DEVICE — ENDO STITCH DEVICE 10 MM

## (undated) DEVICE — GLOVE INDICATOR PI UNDERGLOVE SZ 7.5 BLUE

## (undated) DEVICE — TRAVELKIT CONTAINS FIRST STEP KIT (200ML EP-4 KIT) AND SOILED SCOPE BAG - 1 KIT: Brand: TRAVELKIT CONTAINS FIRST STEP KIT AND SOILED SCOPE BAG

## (undated) DEVICE — BLADE SHAVER DISSECTOR 3.5MM 13CM COOLCUT

## (undated) DEVICE — NEEDLE 18 G X 1 1/2

## (undated) DEVICE — [HIGH FLOW INSUFFLATOR,  DO NOT USE IF PACKAGE IS DAMAGED,  KEEP DRY,  KEEP AWAY FROM SUNLIGHT,  PROTECT FROM HEAT AND RADIOACTIVE SOURCES.]: Brand: PNEUMOSURE

## (undated) DEVICE — ENDOPATH XCEL UNIVERSAL TROCAR STABLILITY SLEEVES: Brand: ENDOPATH XCEL

## (undated) DEVICE — VISIGI 3D®  CALIBRATION SYSTEM  SIZE 36FR SLEEVE/STD: Brand: BOEHRINGER® VISIGI 3D™ SLEEVE GASTRECTOMY CALIBRATION SYSTEM, SIZE 36FR

## (undated) DEVICE — STAPLER ENDO GIA ROTICULATOR 60-2.5

## (undated) DEVICE — LIGACLIP 10-M/L, 10MM ENDOSCOPIC ROTATING MULTIPLE CLIP APPLIERS: Brand: LIGACLIP

## (undated) DEVICE — INTENDED FOR TISSUE SEPARATION, AND OTHER PROCEDURES THAT REQUIRE A SHARP SURGICAL BLADE TO PUNCTURE OR CUT.: Brand: BARD-PARKER ® CARBON RIB-BACK BLADES

## (undated) DEVICE — HARMONIC ACE +7 LAPAROSCOPIC SHEARS ADVANCED HEMOSTASIS 5MM DIAMETER 36CM SHAFT LENGTH  FOR USE WITH GRAY HAND PIECE ONLY: Brand: HARMONIC ACE